# Patient Record
Sex: MALE | Race: WHITE | Employment: OTHER | URBAN - METROPOLITAN AREA
[De-identification: names, ages, dates, MRNs, and addresses within clinical notes are randomized per-mention and may not be internally consistent; named-entity substitution may affect disease eponyms.]

---

## 2018-11-20 ENCOUNTER — HOSPITAL ENCOUNTER (OUTPATIENT)
Dept: LAB | Age: 73
Discharge: HOME | End: 2018-11-20
Payer: MEDICARE

## 2018-11-20 LAB — VIT B12 SERPL-MCNC: >2000 PG/ML (ref 211–911)

## 2018-11-20 PROCEDURE — 36415 COLL VENOUS BLD VENIPUNCTURE: CPT

## 2018-11-20 PROCEDURE — 82607 VITAMIN B-12: CPT

## 2018-11-26 LAB
FAX TO INFO,FAXT: NORMAL
FAX TO NUMBER,FAXN: NORMAL

## 2021-10-31 ENCOUNTER — HOSPITAL ENCOUNTER (INPATIENT)
Age: 76
LOS: 2 days | Discharge: HOME | DRG: 065 | End: 2021-11-02
Attending: EMERGENCY MEDICINE | Admitting: HOSPITALIST
Payer: MEDICARE

## 2021-10-31 DIAGNOSIS — I48.92 ATRIAL FLUTTER, UNSPECIFIED TYPE (HCC): ICD-10-CM

## 2021-10-31 PROCEDURE — 65660000004 HC RM CVT STEPDOWN

## 2021-11-01 ENCOUNTER — APPOINTMENT (OUTPATIENT)
Dept: MRI IMAGING | Age: 76
DRG: 065 | End: 2021-11-01
Attending: HOSPITALIST
Payer: MEDICARE

## 2021-11-01 ENCOUNTER — APPOINTMENT (OUTPATIENT)
Dept: NON INVASIVE DIAGNOSTICS | Age: 76
DRG: 065 | End: 2021-11-01
Attending: HOSPITALIST
Payer: MEDICARE

## 2021-11-01 PROBLEM — I63.9 ACUTE CVA (CEREBROVASCULAR ACCIDENT) (HCC): Status: ACTIVE | Noted: 2021-11-01

## 2021-11-01 LAB
ATRIAL RATE: 102 BPM
CALCULATED R AXIS, ECG10: -60 DEGREES
CALCULATED T AXIS, ECG11: 48 DEGREES
DIAGNOSIS, 93000: NORMAL
ECHO AO ROOT DIAM: 4.36 CM
ECHO LA AREA 4C: 17.14 CM2
ECHO LA VOL 2C: 28.59 ML (ref 18–58)
ECHO LA VOL 4C: 40.65 ML (ref 18–58)
ECHO LA VOL BP: 38.16 ML (ref 18–58)
ECHO LA VOL/BSA BIPLANE: 16.81 ML/M2 (ref 16–28)
ECHO LA VOLUME INDEX A2C: 12.59 ML/M2 (ref 16–28)
ECHO LA VOLUME INDEX A4C: 17.91 ML/M2 (ref 16–28)
ECHO LV INTERNAL DIMENSION DIASTOLIC: 5.31 CM (ref 4.2–5.9)
ECHO LV INTERNAL DIMENSION SYSTOLIC: 3.63 CM
ECHO LV IVSD: 1.41 CM (ref 0.6–1)
ECHO LV MASS 2D: 313.3 G (ref 88–224)
ECHO LV MASS INDEX 2D: 138 G/M2 (ref 49–115)
ECHO LV POSTERIOR WALL DIASTOLIC: 1.35 CM (ref 0.6–1)
ECHO LVOT DIAM: 2.14 CM
ECHO LVOT PEAK GRADIENT: 3.22 MMHG
ECHO LVOT PEAK VELOCITY: 89.76 CM/S
ECHO LVOT SV: 62.3 ML
ECHO LVOT VTI: 17.29 CM
ECHO MV A VELOCITY: 53.67 CM/S
ECHO MV E DECELERATION TIME (DT): 171.27 MS
ECHO MV E VELOCITY: 92.93 CM/S
ECHO MV E/A RATIO: 1.73
LVOT MG: 1.37 MMHG
Q-T INTERVAL, ECG07: 412 MS
QRS DURATION, ECG06: 108 MS
QTC CALCULATION (BEZET), ECG08: 457 MS
TSH SERPL DL<=0.05 MIU/L-ACNC: 3.89 UIU/ML (ref 0.36–3.74)
VENTRICULAR RATE, ECG03: 74 BPM

## 2021-11-01 PROCEDURE — 92610 EVALUATE SWALLOWING FUNCTION: CPT

## 2021-11-01 PROCEDURE — 97165 OT EVAL LOW COMPLEX 30 MIN: CPT

## 2021-11-01 PROCEDURE — 99222 1ST HOSP IP/OBS MODERATE 55: CPT | Performed by: PSYCHIATRY & NEUROLOGY

## 2021-11-01 PROCEDURE — 93306 TTE W/DOPPLER COMPLETE: CPT

## 2021-11-01 PROCEDURE — 74011250637 HC RX REV CODE- 250/637: Performed by: HOSPITALIST

## 2021-11-01 PROCEDURE — 36415 COLL VENOUS BLD VENIPUNCTURE: CPT

## 2021-11-01 PROCEDURE — 65660000000 HC RM CCU STEPDOWN

## 2021-11-01 PROCEDURE — 84443 ASSAY THYROID STIM HORMONE: CPT

## 2021-11-01 PROCEDURE — 93005 ELECTROCARDIOGRAM TRACING: CPT

## 2021-11-01 PROCEDURE — 70551 MRI BRAIN STEM W/O DYE: CPT

## 2021-11-01 PROCEDURE — 97530 THERAPEUTIC ACTIVITIES: CPT

## 2021-11-01 PROCEDURE — 74011250637 HC RX REV CODE- 250/637: Performed by: INTERNAL MEDICINE

## 2021-11-01 PROCEDURE — 97162 PT EVAL MOD COMPLEX 30 MIN: CPT

## 2021-11-01 PROCEDURE — 74011250636 HC RX REV CODE- 250/636: Performed by: HOSPITALIST

## 2021-11-01 PROCEDURE — 97116 GAIT TRAINING THERAPY: CPT

## 2021-11-01 PROCEDURE — 99223 1ST HOSP IP/OBS HIGH 75: CPT | Performed by: HOSPITALIST

## 2021-11-01 RX ORDER — CLOPIDOGREL BISULFATE 75 MG/1
75 TABLET ORAL DAILY
Status: DISCONTINUED | OUTPATIENT
Start: 2021-11-02 | End: 2021-11-01

## 2021-11-01 RX ORDER — GUAIFENESIN 100 MG/5ML
81 LIQUID (ML) ORAL DAILY
Status: DISCONTINUED | OUTPATIENT
Start: 2021-11-01 | End: 2021-11-02 | Stop reason: HOSPADM

## 2021-11-01 RX ORDER — HEPARIN SODIUM 5000 [USP'U]/ML
5000 INJECTION, SOLUTION INTRAVENOUS; SUBCUTANEOUS EVERY 8 HOURS
Status: DISCONTINUED | OUTPATIENT
Start: 2021-11-01 | End: 2021-11-01 | Stop reason: SDUPTHER

## 2021-11-01 RX ORDER — ATORVASTATIN CALCIUM 40 MG/1
80 TABLET, FILM COATED ORAL
Status: DISCONTINUED | OUTPATIENT
Start: 2021-11-01 | End: 2021-11-02 | Stop reason: HOSPADM

## 2021-11-01 RX ORDER — SODIUM CHLORIDE 9 MG/ML
75 INJECTION, SOLUTION INTRAVENOUS CONTINUOUS
Status: DISPENSED | OUTPATIENT
Start: 2021-11-01 | End: 2021-11-02

## 2021-11-01 RX ADMIN — APIXABAN 5 MG: 5 TABLET, FILM COATED ORAL at 18:05

## 2021-11-01 RX ADMIN — HEPARIN SODIUM 5000 UNITS: 5000 INJECTION INTRAVENOUS; SUBCUTANEOUS at 09:22

## 2021-11-01 RX ADMIN — SODIUM CHLORIDE 75 ML/HR: 900 INJECTION, SOLUTION INTRAVENOUS at 01:41

## 2021-11-01 RX ADMIN — HEPARIN SODIUM 5000 UNITS: 5000 INJECTION INTRAVENOUS; SUBCUTANEOUS at 01:41

## 2021-11-01 RX ADMIN — ATORVASTATIN CALCIUM 80 MG: 40 TABLET, FILM COATED ORAL at 22:09

## 2021-11-01 RX ADMIN — ATORVASTATIN CALCIUM 80 MG: 40 TABLET, FILM COATED ORAL at 01:41

## 2021-11-01 RX ADMIN — ASPIRIN 81 MG CHEWABLE TABLET 81 MG: 81 TABLET CHEWABLE at 09:22

## 2021-11-01 NOTE — PROGRESS NOTES
OCCUPATIONAL THERAPY EVALUATION/DISCHARGE    Patient: Maico Ahmadi (24 y.o. male)  Date: 11/1/2021  Primary Diagnosis: Acute CVA (cerebrovascular accident) [facility]) [I63.9]        Precautions:   Fall  PLOF: Pt was independent with basic self care tasks and used a walking stick for functional mobility PTA. He lived alone. ASSESSMENT AND RECOMMENDATIONS:  Based on the objective data described below, the patient is able to perform basic self care tasks without assistance while seated. He has a right homonymous hemianopsia limiting his spatial awareness with mobility. Patient able to compensate by consciously deviating eyes and/or head to the right. Discussed compensatory strategies such using red tape or visual marker and he verbalized understanding. Supervision given for functional standing and transfers. He had a loss of balance initially with standing from bed but was able to self correct. Will defer to PT for mobility training. Patient has a supportive daughter who he will stay with upon discharge to assist him prn. Skilled occupational therapy is not indicated at this time. Discharge Recommendations: Home Health to assess adaptive strategies at home  Further Equipment Recommendations for Discharge: N/A; patient has canes     SUBJECTIVE:   Patient stated I have a little bit of trouble thinking.     OBJECTIVE DATA SUMMARY:   No past medical history on file. No past surgical history on file.   Barriers to Learning/Limitations: yes;  altered mental status (i.e. Mild confusion at times)  Compensate with: visual, verbal, tactile, kinesthetic cues/model    Home Situation:   Home Situation  Home Environment: Private residence  # Steps to Enter: 3  Rails to Enter: Yes  Hand Rails : Right  One/Two Story Residence: Two story  # of Interior Steps: 13  Interior Rails: Left  Lift Chair Available: Yes  Living Alone: Yes  Support Systems: Child(hui)  Patient Expects to be Discharged toF Cor[de-identified]ration  Current DME Used/Available at Home: Cane, straight  Tub or Shower Type: Shower (with grab bar)  []     Right hand dominant   [x]     Left hand dominant    Cognitive/Behavioral Status:  Neurologic State: Alert  Orientation Level: Oriented X4  Cognition: Follows commands  Safety/Judgement: Fall prevention    Skin: Intact on UEs  Edema: None noted in UEs    Vision/Perceptual:    Tracking: Able to track stimulus in all quadrants w/o difficulty      Visual Fields: Difficulty detecting stimulus  in right lateral quadrant; Difficulty detecting stimulus in right lower quadrant; Difficulty detecting stimulus in right upper quadrant   Acuity: Within Defined Limits      Coordination: BUE  Fine Motor Skills-Upper: Left Intact; Right Intact    Gross Motor Skills-Upper: Left Intact; Right Intact    Balance:  Sitting: Intact  Sitting - Static: Good (unsupported)  Sitting - Dynamic: Good (unsupported)  Standing: Impaired  Standing - Static: Good  Standing - Dynamic : Good    Strength: BUE  Strength: Within functional limits    Tone & Sensation: BUE  Tone: Normal  Sensation: Intact    Range of Motion: BUE  AROM: Within functional limits    Functional Mobility and Transfers for ADLs:  Bed Mobility:  Supine to Sit: Modified independent  Sit to Supine: Modified independent    Transfers:  Sit to Stand: Supervision  Stand to Sit: Supervision   Toilet Transfer : Supervision    ADL Assessment:  Feeding: Modified independent    Oral Facial Hygiene/Grooming: Modified Independent    Bathing: Supervision    Upper Body Dressing: Modified independent    Lower Body Dressing: Supervision    Toileting: Supervision    Pain:  Pain level pre-treatment: 0/10   Pain level post-treatment: 0/10   Pain Intervention(s): NA  Response to intervention: NA    Activity Tolerance:   Good  Please refer to the flowsheet for vital signs taken during this treatment.   After treatment:   []  Patient left in no apparent distress sitting up in chair  [x]  Patient left in no apparent distress in bed  [x]  Call bell left within reach  [x]  Nursing notified  []  Caregiver present  []  Bed alarm activated    COMMUNICATION/EDUCATION:   [x]      Role of Occupational Therapy in the acute care setting  [x]      Home safety education was provided and the patient/caregiver indicated understanding. [x]      Patient/family have participated as able and agree with findings and recommendations. []      Patient is unable to participate in plan of care at this time. Thank you for this referral.  Halie Hooper MS OTR/L   Time Calculation: 23 mins      Eval Complexity: History: LOW Complexity : Brief history review ; Examination: LOW Complexity : 1-3 performance deficits relating to physical, cognitive , or psychosocial skils that result in activity limitations and / or participation restrictions ;    Decision Making:LOW Complexity : No comorbidities that affect functional and no verbal or physical assistance needed to complete eval tasks

## 2021-11-01 NOTE — PROGRESS NOTES
ARU/IPR REFERRAL CONTACT NOTE    [address] [place] [address] for Physical Rehabilitation  RE:Bunny Mclean   Thank you for the opportunity to review this patient's case for admission to [address] [place] [address] for Physical Rehabilitation. Based on our pre-admission screening:     [x ] This patient does not meet criteria for admission to [facility] for Physical Rehabilitation due to:    [x ] Too functional, per documentation, patient does not require both Physical and Occupational Therapy Services at an acute rehabilitation level of intensity. Again, Thank you for this referral. Should you have any questions please do not hesitate to call. Sincerely,  Hossein Palmer. Eva Haskins, [address]   Eva Haskins RN  Admissions [facility] for Physical Rehabilitation  [phone]

## 2021-11-01 NOTE — PROGRESS NOTES
Per pt, his wife listed as his emergency contact passed away last year and he wants his daughter Pavithra Thacker [phone] as his emergency contact. Face sheet has been updated.             TRISH DennisN RN  Care Management  Pager: [phone]

## 2021-11-01 NOTE — PROGRESS NOTES
TRANSFER - IN REPORT:    [facility] Olga Bradshaw  being received from [facility] (unit) for routine progression of care    Report consisted of patients Situation, Background, Assessment and   Recommendations(SBAR). Information from the following report(s) SBAR, ED Summary, Intake/Output, MAR, Recent Results and Cardiac Rhythm NSR was reviewed with the receiving nurse. Opportunity for questions and clarification was provided. Assessment completed upon patients arrival to unit and care assumed. 2330 Patient arrived to unit via transport services from [facility]. VS stable. No reports of pain. Patient's only complaint is blurry peripheral vision on the right side. Patient also reports \"some memory trouble\" that has persisted since the onset of blurry vision a few days ago. Patient was oriented to the unit, pain reporting procedures, call bell usage, and vital sign assessments. 0300 Dysphagia screening conducted. No abnormal findings.

## 2021-11-01 NOTE — PROGRESS NOTES
MRI Screening form needs to be filled out and faxed to [address] MRI can be scheduled. If unable to obtain information from pt, [facility] needs to be contacted.  If pt is claustro or will need pain meds, please have ordered in advance in order to facilitate exam.

## 2021-11-01 NOTE — PROGRESS NOTES
Problem: Dysphagia (Adult)  Goal: *Acute Goals and Plan of Care (Insert Text)  Description: Patient will:  1. Tolerate PO trials with 0 s/s overt distress in 4/5 trials-met    Recommend:   Regular diet with thin liquids  Meds per pt preference  General safe swallow precautions     Outcome: Resolved/Met    Speech LAnguage Pathology bedside swallow evaluation AND DISCHARGE    Patient: Moses Vazquez (89 y.o. male)  Date: 11/1/2021  Primary Diagnosis: Acute CVA (cerebrovascular accident) [facility]) [I63.9]  Precautions: Fall    ASSESSMENT :  Clinical beside swallow eval completed per MD orders. Pt A&Ox4. Speech/voice within functional limits. Oral motor structures functional for speech and deglutition. Pt tolerating thin liquids and regular solids with no overt s/sx aspiration. Pt safe for regular diet with thin liquids, meds as tolerated with general safe swallow precautions. Pt educated with regard to s/sx aspiration, aspiration risk, diet recs and role of SLP. Pt able to verbalize understanding. Will sign off. Please re-consult as indicated. D/w RN. PLAN :  Recommendations and Planned Interventions:  No formal ST needs ID'd for dysphagia. Eval only. Discharge Recommendations: None     SUBJECTIVE:   Patient stated Thank you    OBJECTIVE:   No past medical history on file. No past surgical history on file.   Prior Level of Function/Home Situation:   Home Situation  Home Environment: Private residence  # Steps to Enter: 3  Rails to Enter: Yes  Hand Rails : Right  One/Two Story Residence: Two story  # of Interior Steps: 13  Interior Rails: Left  Lift Chair Available: Yes  Living Alone: Yes  Support Systems: Child(hui)  Patient Expects to be Discharged to[de-identified] House  Current DME Used/Available at Home: Cane, straight  Diet prior to admission: regular/thin liquids  Current Diet:  regular/thin liquids    Cognitive and Communication Status:  Neurologic State: Alert  Orientation Level: Oriented X4  Cognition: Follows commands  Oral Assessment:  Oral Assessment  Labial: No impairment  Dentition: Natural  Oral Hygiene: Good  Lingual: No impairment  Velum: No impairment  Mandible: No impairment  P.O.  Trials:  Patient Position: 45 at [address] [facility]  Vocal quality prior to P.O.: No impairment  Aspiration Signs/Symptoms: None  Oral Phase Severity: No impairment  Pharyngeal Phase Severity : No impairment    The severity rating is based on the following outcomes:    Clinical judgment    Pain:  Not reported prior to or following     After treatment:   []            Patient left in no apparent distress sitting up in chair  [x]            Patient left in no apparent distress in bed  [x]            Call bell left within reach  [x]            Nursing notified  []            Caregiver present  []            Bed alarm activated    COMMUNICATION/EDUCATION:   [x]            SLP educated pt with regard to aspiration s/sx and to alert MD/RN     Thank you for this referral,   JUDIE CatherineS., [address] [facility]  Speech-Language Pathologist

## 2021-11-01 NOTE — PROGRESS NOTES
Patient is off to the floor (radiology) at the time of visit. Patient is not available to be assessed at this time.       Diann Cabrera 605 [phone]

## 2021-11-01 NOTE — H&P
History & Physical    Patient: Amparo Cintron MRN: [id number]  CSN: [id number]    YOB: 1945  Age: 70 y.o. Sex: male      DOA: 10/31/2021    No chief complaint on file. Dragon medical dictation software was used for portions of this report. Unintended errors may occur. If you have any questions regarding the note or its content please set up a time to discuss by calling the nurse on the floor. Assessment/Plan     Acute CVA: Loss of peripheral vision on the right side. We will do neurochecks. Telemetry. Will check echocardiogram.  Will check MRI of the brain to rule out stroke. Will place on aspirin, statin. BPH: will resume flomax HS. Hypertension: will resume Lisinopril 5 mg daily. DVT prophylaxis: heparin Sq. Active Problems:    Acute CVA (cerebrovascular accident) ([facility] [address] (11/1/2021)         HPI:     Amparo Cintron is a 70 y.o. male who presented to emergency room in [facility] emergency room with right-sided peripheral vision for the 1 to 2 days. Patient is from [place] to go hunting and noticed some blurriness on the right side and it slowly went away. And then he is noticed that it came back and persisted since so presented to the emergency room there. Patient denies any weakness no speech issues no numbness and he mentions that he takes a daily aspirin on a regular basis and never had any history of TIA or CVA or any coronary artery disease. Patient had a CTA of the head that showed a mild atherosclerosis and a normal patent abdominal artery. Patient did have some blockage of the right and left internal carotid artery but no significant proximal arterial disease. CT head did not show any acute abnormalities. EKG showed sinus bradycardia and a left bundle branch block. No past medical history on file. No past surgical history on file. No family history on file.     Social History     Socioeconomic History    Marital status:  Spouse name: Not on file    Number of children: Not on file    Years of education: Not on file    Highest education level: Not on file     Social Determinants of Health     Financial Resource Strain:     Difficulty of Paying Living Expenses:    Food Insecurity:     Worried About Running Out of Food in the Last Year:     [address] in the Last Year:    Transportation Needs:     Lack of Transportation (Medical):  Lack of Transportation (Non-Medical):    Physical Activity:     Days of Exercise per Week:     Minutes of Exercise per Session:    Stress:     Feeling of Stress :    Social Connections:     Frequency of Communication with Friends and Family:     Frequency of Social Gatherings with Friends and Family:     Attends Christian Services:     Active Member of Clubs or Organizations:     Attends Club or Organization Meetings:     Marital Status:        Prior to Admission medications       Aspirin 81 mg daily  Lipitor 80 mg daily  Plavix on 5 g daily  Lisinopril 5 mg daily  Flomax 0.4 mg at bedtime  Ergocalciferol 50,000 units once a week  Neurontin 600 mg daily  Cyanocobalamin 1000 units once a day    No known drug allergies    Review of Systems  GENERAL: No fevers or chills. HEENT: No change in vision, no earache, tinnitus, sore throat or sinus congestion. NECK: No pain or stiffness. CARDIOVASCULAR: No chest pain or pressure. No palpitations. PULMONARY: No shortness of breath, cough or wheeze. GASTROINTESTINAL: No abdominal pain, nausea, vomiting or diarrhea, melena or bright red blood per rectum. GENITOURINARY: No urinary frequency, urgency, hesitancy or dysuria. MUSCULOSKELETAL: No joint or muscle pain, no back pain, no recent trauma. DERMATOLOGIC: No rash, no itching, no lesions. ENDOCRINE: No polyuria, polydipsia, no heat or cold intolerance. No recent change in weight. HEMATOLOGICAL: No anemia or easy bruising or bleeding.    NEUROLOGIC: No headache, seizures, numbness, tingling or weakness. PSYCHIATRIC: No depression, anxiety, mood disorder, no loss of interest in normal activity or change in sleep pattern. Physical Exam:     Physical Exam:  Vitals reviewed. General:  Alert, cooperative, no distress, appears stated age. Head:  Normocephalic, without obvious abnormality, atraumatic. Eyes:  Conjunctivae/corneas clear. PERRL, EOMs intact. Nose: Nares normal. No drainage or sinus tenderness. Throat: Lips, mucosa, and tongue normal. Teeth and gums normal.   Neck: Supple, symmetrical, trachea midline, no adenopathy, thyroid: no enlargement/tenderness/nodules, no carotid bruit and no JVD. Back:   ROM normal. No CVA tenderness. Lungs:   Clear to auscultation bilaterally. Chest wall:  No tenderness or deformity. Heart:  Regular rate and rhythm, S1, S2 normal, no murmur, click, rub or gallop. Abdomen: Soft, non-tender. Bowel sounds normal. No masses,  No organomegaly. Extremities: Extremities normal, atraumatic, no cyanosis or edema. Pulses: 2+ and symmetric all extremities. Skin: Skin color, texture, turgor normal. No rashes or lesions   Neurologic: CNII-XII intact. No focal motor or sensory deficit. Peripheral loss of vision. Right side.        Labs Reviewed:  Hemoglobin A1c 5.7 total cholesterol 131  HDL 35  LDL 70  VLDL 26  Triglycerides 128    Sodium 136 potassium 4.4 chloride 100 bicarbonate 28 BUN 14 creatinine 0.79 blood sugars 106      CTA HEAD/NECK STROKE ALERT (EXP) (10/29/2021 12:34 PM EDT)  CTA HEAD/NECK STROKE ALERT (EXP) (10/29/2021 12:34 PM EDT)   Specimen         CTA HEAD/NECK STROKE ALERT (EXP) (10/29/2021 12:34 PM EDT)   Narrative Performed At   CLINICAL HISTORY: 70 years Male Neuro deficit, acute, stroke suspected   blurry RIGHT-sided peripheral vision for 2 days    EXAM: CTA HEAD/NECK STROKE ALERT (EXP)     COMPARISON: Noncontrast head CT performed earlier today    TECHNIQUE:     -CT angiogram of the brain and neck with and without intravenous contrast using CTA protocol with axial, coronal, sagittal reformats. Additional 3-D imaging reconstruction was performed for better evaluation of the anatomy.    -Additional CT of the brain was performed after the administration of intravenous contrast.        Dose: Omnipaque 350, 90  mL        FINDINGS:          Enhancement: No abnormal enhancement.         CTA Neck:        SOFT TISSUES/OSSEOUS STRUCTURES        Submandibular glands: Unremarkable    Parotid glands: Unremarkable    Thyroid gland: Normal size    Lymph nodes: Not enlarged by CT size criteria    Airway: Patent    Esophagus: Unremarkable    Visualized portions of the lungs: Clear    Cervical spine/ osseous structures: Multilevel cervical spondylosis. Severe disc height loss at C3-4, C6-7.        ====================        Aortic arch: 3 vessel arch. Mild calcific atherosclerosis.    RIGHT subclavian artery: Unremarkable    LEFT subclavian artery: Unremarkable        RIGHT vertebral artery (V1-V3): Nondominant. There are moderate stenosis of the V1 segment, remainder of the vessel is patent.    LEFT vertebral artery (V1-V3): Dominant, widely patent        RIGHT common carotid artery: Atherosclerotic plaque at the bifurcation without significant stenosis    RIGHT internal carotid artery: Calcified and noncalcified plaque in the proximal portion contributes to 68% stenosis by NASCET criteria, approximately 15 mm beyond the origin (series 5, image 272)    RIGHT external carotid artery: Moderate stenosis proximally, otherwise unremarkable        LEFT common carotid artery: Atherosclerotic plaque at the bifurcation without significant stenosis    LEFT internal carotid artery: Calcified and noncalcified plaque in the proximal portion contributes to 62% stenosis by NASCET criteria (series 5, image 255)    LEFT external carotid artery: Unremarkable        CTA Head:        Dural venous sinuses: Patent        RIGHT intracranial ICA: Mild siphon atherosclerosis. Ophthalmic artery is patent.    LEFT intracranial ICA: Mild siphon atherosclerosis. Ophthalmic artery is patent.        RIGHT anterior cerebral artery: Unremarkable    LEFT anterior cerebral artery: Unremarkable    Anterior communicating artery: Not well seen        RIGHT middle cerebral artery: Unremarkable    LEFT middle cerebral artery: Unremarkable        RIGHT posterior communicating artery: Robust     LEFT posterior communicating artery: Faintly seen         RIGHT posterior cerebral artery: Fetal type origin. Otherwise unremarkable    LEFT posterior cerebral artery: Unremarkable        Basilar artery: Normal         RIGHT superior cerebellar artery: Patent    LEFT superior cerebellar artery: Patent        RIGHT intracranial vertebral artery (V4): Unremarkable    LEFT intracranial vertebral artery (V4): Unremarkable        RIGHT posterior inferior cerebellar artery: Patent    LEFT posterior inferior cerebellar artery: Patent        IMPRESSION:        1. RIGHT internal carotid artery: Calcified and noncalcified plaque in the proximal portion contributes to 68% stenosis by NASCET criteria.    2. LEFT internal carotid artery: Calcified and noncalcified plaque in the proximal portion contributes to 62% stenosis by NASCET criteria (series 5, image 255)    3.  No evidence of significant stenosis of the proximal arterial vessel in the brain.   [facility]       CTA HEAD/NECK STROKE ALERT (EXP) (10/29/2021 12:34 PM EDT)   Procedure Note   Paul, Radiologyin - 10/29/2021 1:08 PM EDT    Formatting of this note might be different from the original.  CLINICAL HISTORY: 70 years Male Neuro deficit, acute, stroke suspected blurry RIGHT-sided peripheral vision for 2 days  EXAM: CTA HEAD/NECK STROKE ALERT (EXP)   COMPARISON: Noncontrast head CT performed earlier today  TECHNIQUE:   -CT angiogram of the brain and neck with and without intravenous contrast using CTA protocol with axial, coronal, sagittal reformats. Additional 3-D imaging reconstruction was performed for better evaluation of the anatomy.  -Additional CT of the brain was performed after the administration of intravenous contrast.    Dose: Omnipaque 350, 90 mL    FINDINGS:     Enhancement: No abnormal enhancement. CTA Neck:    SOFT TISSUES/OSSEOUS STRUCTURES    Submandibular glands: Unremarkable  Parotid glands: Unremarkable  Thyroid gland: Normal size  Lymph nodes: Not enlarged by CT size criteria  Airway: Patent  Esophagus: Unremarkable  Visualized portions of the lungs: Clear  Cervical spine/ osseous structures: Multilevel cervical spondylosis. Severe disc height loss at C3-4, C6-7.    ====================    Aortic arch: 3 vessel arch. Mild calcific atherosclerosis. RIGHT subclavian artery: Unremarkable  LEFT subclavian artery: Unremarkable    RIGHT vertebral artery (V1-V3): Nondominant. There are moderate stenosis of the V1 segment, remainder of the vessel is patent. LEFT vertebral artery (V1-V3): Dominant, widely patent    RIGHT common carotid artery: Atherosclerotic plaque at the bifurcation without significant stenosis  RIGHT internal carotid artery: Calcified and noncalcified plaque in the proximal portion contributes to 68% stenosis by NASCET criteria, approximately 15 mm beyond the origin (series 5, image 272)  RIGHT external carotid artery: Moderate stenosis proximally, otherwise unremarkable    LEFT common carotid artery: Atherosclerotic plaque at the bifurcation without significant stenosis  LEFT internal carotid artery: Calcified and noncalcified plaque in the proximal portion contributes to 62% stenosis by NASCET criteria (series 5, image 255)  LEFT external carotid artery: Unremarkable    CTA Head:    Dural venous sinuses: Patent    RIGHT intracranial ICA: Mild siphon atherosclerosis. Ophthalmic artery is patent. LEFT intracranial ICA: Mild siphon atherosclerosis. Ophthalmic artery is patent.     RIGHT anterior cerebral artery: Unremarkable  LEFT anterior cerebral artery: Unremarkable  Anterior communicating artery: Not well seen    RIGHT middle cerebral artery: Unremarkable  LEFT middle cerebral artery: Unremarkable    RIGHT posterior communicating artery: Robust   LEFT posterior communicating artery: Faintly seen     RIGHT posterior cerebral artery: Fetal type origin. Otherwise unremarkable  LEFT posterior cerebral artery: Unremarkable    Basilar artery: Normal     RIGHT superior cerebellar artery: Patent  LEFT superior cerebellar artery: Patent    RIGHT intracranial vertebral artery (V4): Unremarkable  LEFT intracranial vertebral artery (V4): Unremarkable    RIGHT posterior inferior cerebellar artery: Patent  LEFT posterior inferior cerebellar artery: Patent    IMPRESSION:    1. RIGHT internal carotid artery: Calcified and noncalcified plaque in the proximal portion contributes to 68% stenosis by NASCET criteria. 2. LEFT internal carotid artery: Calcified and noncalcified plaque in the proximal portion contributes to 62% stenosis by NASCET criteria (series 5, image 255)  3. No evidence of significant stenosis of the proximal arterial vessel in the brain.       CTA HEAD/NECK STROKE ALERT (EXP) (10/29/2021 12:34 PM EDT)   Performing Organization Address City/State/ZIP Code Phone Number   Aníbal Casarez                 Procedures/imaging: see electronic medical records for all procedures/Xrays and details which were not copied into this note but were reviewed prior to creation of Ghada George MD  November 1, 2021  [phone].

## 2021-11-01 NOTE — PROGRESS NOTES
conducted an initial consultation and Spiritual Assessment for Moni Saavedra, who is a 70 y. o.,male. Patients Primary Language is: [place]. According to the patients EMR Holiness Affiliation is: Alevism. The reason the Patient came to the hospital is:   Patient Active Problem List    Diagnosis Date Noted    [facility] 11/01/2021        The  provided the following Interventions:  Initiated a relationship of care and support with patient in room 99 [id number] today as he was cleaning up with the morning rituals. Listened empathically as patient shared his opinions about being here and how he was feeling. Patient has no complaints and expects this to be a short stay. There is no advance directive. Provided information about Spiritual Care Services. Offered prayer and assurance of continued prayers on patients behalf. The following outcomes were achieved:  Patient shared limited information about his medical narrative and spiritual journey/beliefs. Patient processed feeling about current hospitalization. Patient expressed gratitude for pastoral care visit. Assessment:  Patient does not have any Advent/cultural needs that will affect patients preferences in health care. There are no further spiritual or Advent issues which require Spiritual Care Services interventions at this time. Plan:  Chaplains will continue to follow and will provide pastoral care on an as needed/requested basis    . Rue All   Spiritual Care   [phone]

## 2021-11-01 NOTE — PROGRESS NOTES
Reason for Admission:  Acute CVA (cerebrovascular accident) ([facility] [I63.9]                 RUR Score:    10           Plan for utilizing home health: To be determined                      Likelihood of Readmission:   LOW                         Transition of Care Plan:              Initial assessment completed with patient. Cognitive status of patient: oriented to time, place, person and situation. Face sheet information confirmed:  yes. The patient designates her daughter Gabriella Walters [phone] to participate in his discharge plan and to receive any needed information. This patient lives in a home  alone. Patient is able to navigate steps as needed. Prior to hospitalization, patient was considered to be independent with ADLs/IADLS : yes . Patient has a current ACP document on file: no      Healthcare Decision Maker:     Click here to complete [address] including selection of the Healthcare Decision Maker Relationship (ie \"Primary\")    The daughter will be available to transport patient home upon discharge. The patient already has none reported medical equipment available in the home. Patient is not currently active with home health. Patient has not stayed in a skilled nursing facility or rehab. Was  stay within last 60 days : no. This patient is on dialysis :noList of available Home Health agencies were provided and reviewed with the patient prior to discharge. Freedom of choice signed: yes, for anyaccepting home health agency. Currently, the discharge plan is Home with [address] [place] [address] Dillan Gomezmike. The patient states that he can obtain his medications from the pharmacy, and take his medications as directed. Patient's current insurance is Medicare,        Care Management Interventions  PCP Verified by CM: No (per pt, his pcp is in [place])  Palliative Care Criteria Met (RRAT>21 & CHF Dx)?: No  Mode of Transport at Discharge:  Other (see comment) (family)  Transition of Care Consult (CM Consult): Discharge Planning  Physical Therapy Consult: Yes  Occupational Therapy Consult: Yes  Support Systems: Child(hui)  Confirm Follow Up Transport: Family  The Patient and/or Patient Representative was Provided with a Choice of Provider and Agrees with the Discharge Plan?: Yes  Freedom of Choice List was Provided with Basic Dialogue that Supports the Patient's Individualized Plan of Care/Goals, Treatment Preferences and Shares the Quality Data Associated with the Providers?: Yes  Discharge Location  Discharge Placement: Home with home health        NATI Mann RN  Care Management  Pager: [phone]

## 2021-11-01 NOTE — CONSULTS
NEUROLOGY CONSULT NOTE    Patient ID:  Joel Ruiz  [id number]  9 y.o.  1945    Date of Consultation:  November 1, 2021    Referring Physician: DR Anna Shields    Reason for Consultation:  Visual disturbance        Subjective:       History of Present Illness:     Joel Ruiz is a 70 y.o. male who presented to emergency room with right-sided peripheral vision which began about 4 days ago. Patient is from [address] [place] [address] to go hunting and noticed some blurriness on the right side and it slowly went away. And then he is noticed that it came back and persisted since so presented to the emergency room there. Patient denies any weakness no speech issues no numbness and he mentions that he takes a daily aspirin on a regular basis and never had any history of TIA or CVA or any coronary artery disease. Patient had a CTA of the head that showed a mild atherosclerosis and a normal patent abdominal artery. Patient did have some blockage of the right and left internal carotid artery but no significant proximal arterial disease. CT head did not show any acute abnormalities. EKG showed sinus bradycardia and a left bundle branch block. He denies any irregular heart rate or concern for prior arrhythmias             Prior to Admission medications        Aspirin 81 mg daily  Lipitor 80 mg daily  Lisinopril 5 mg daily  Flomax 0.4 mg at bedtime  Ergocalciferol 50,000 units once a week  Neurontin 600 mg daily  Cyanocobalamin 1000 units once a day     No known drug allergies     Review of Systems  GENERAL: No fevers or chills. HEENT: No change in vision, no earache, tinnitus, sore throat or sinus congestion. NECK: No pain or stiffness. CARDIOVASCULAR: No chest pain or pressure. No palpitations. PULMONARY: No shortness of breath, cough or wheeze. GASTROINTESTINAL: No abdominal pain, nausea, vomiting or diarrhea, melena or bright red blood per rectum. GENITOURINARY: No urinary frequency, urgency, hesitancy or dysuria. MUSCULOSKELETAL: No joint or muscle pain, no back pain, no recent trauma. DERMATOLOGIC: No rash, no itching, no lesions. ENDOCRINE: No polyuria, polydipsia, no heat or cold intolerance. No recent change in weight. HEMATOLOGICAL: No anemia or easy bruising or bleeding. NEUROLOGIC: No headache, seizures, numbness, tingling or weakness. PSYCHIATRIC: No depression, anxiety, mood disorder, no loss of interest in normal activity or change in sleep pattern. Patient Active Problem List    Diagnosis Date Noted    Acute CVA (cerebrovascular accident) ([facility] 11/01/2021     No past medical history on file. No past surgical history on file. Prior to Admission medications    Not on File     No Known Allergies   Social History     Tobacco Use    Smoking status: Not on file   Substance Use Topics    Alcohol use: Not on file      No family history on file. Objective:     Patient Vitals for the past 8 hrs:   BP Temp Pulse Resp SpO2 Height Weight   11/01/21 1652 (!) 156/86 98.9 °F (37.2 °C) 65 18 95 %     11/01/21 1152 (!) 151/73 98.1 °F (36.7 °C) 72 18 96 %     11/01/21 1113 (!) 154/79     6' (1.829 m) 105.2 kg (232 lb)       General Exam  No acute distress, normal body habitus    HEENT: Normocephalic, atraumatic, Sclera anicteric, normal conjunctiva  Mucous membranes: normal color and hydration   Skin: no lesions no rashes, normal color  CV: No carotid bruits,   Heart: regular to rate and rhythm. No murmurs     Neurologic Exam:    Mental status:  Alert, oriented to person, place, time and circumstance  No visual spatial neglect or overt apraxia    Language: normal fluency and comprehension    Cranial nerves: PERRL, Dense right homonymous hemianopia.  Extraocular movements intact and full, face symmetric to movement, Tongue midline with normal strength, palat symmetric    Motor: strength 5/5 throughout  No abnormal movements    Coordination: Normal finger-nose-finger, Normal rapid alternating movements    DTRs (R/L)  Biceps: (2/2)  Brachorad (2/2)  Triceps: (2/2)   Patellar (0/0)  Ankles (0/0)      Sensation: Intact and symmetric to light touch, touch in hands, No pronator drift              Data Review:    Recent Results (from the past 24 hour(s))   ECHO ADULT COMPLETE    Collection Time: 11/01/21 11:31 AM   Result Value Ref Range    IVSd 1.41 (A) 0.60 - 1.00 cm    LVIDd 5.31 4.20 - 5.90 cm    LVIDs 3.63 cm    LVOT d 2.14 cm    LVPWd 1.35 (A) 0.60 - 1.00 cm    LVOT Peak Gradient 3.22 mmHg    Left Ventricular Outflow Tract Mean Gradient 1.37 mmHg    LVOT SV 62.3 mL    LVOT Peak Velocity 89.76 cm/s    LVOT VTI 17.29 cm    LA Volume 38.16 18.0 - 58.0 mL    LA Area 4C 17.14 cm2    LA Vol 2C 28.59 18.00 - 58.00 mL    LA Vol 4C 40.65 18.00 - 58.00 mL    MV A Cr 53.67 cm/s    Mitral Valve E Wave Deceleration Time 171.27 ms    MV E Cr 92.93 cm/s    Ao Root D 4.36 cm    MV E/A 1.73     LV Mass .3 88.0 - 224.0 g    LV Mass AL Index 138.0 49.0 - 115.0 g/m2    LA Vol Index 16.81 16.00 - 28.00 ml/m2    LA Vol Index 12.59 16.00 - 28.00 ml/m2    LA Vol Index 17.91 16.00 - 28.00 ml/m2   EKG, 12 LEAD, INITIAL    Collection Time: 11/01/21  3:40 PM   Result Value Ref Range    Ventricular Rate 74 BPM    Atrial Rate 102 BPM    QRS Duration 108 ms    Q-T Interval 412 ms    QTC Calculation (Bezet) 457 ms    Calculated R Axis -60 degrees    Calculated T Axis 48 degrees    Diagnosis       Atrial flutter with variable AV block  Left anterior fascicular block  Septal infarct , age undetermined  Abnormal ECG  No previous ECGs available  Confirmed by Marisa Caballero MD, ----- ([id number]) on 11/1/2021 4:35:58 PM           Radiology studies: MRI brain reviewed, CTA reviewed      Assessment: This is a 69 y/o active white male with a history of hereditary neuropathy and HTN who presented with an acute onset right hemifield loss. This is due to an embolic appearing left PCA stroke.  Reportedly he is now noted to be in atrial fibrillation which is the most likely cause of this stroke. Active Problems:    Acute CVA (cerebrovascular accident) ([facility] (11/1/2021)        Plan:     1. OK to start anticoagulation  2. No need for permissive HTN at this point. Pat Richmond M.D.   Clinical Neurophysiology  Neuromuscular specialist

## 2021-11-01 NOTE — PROGRESS NOTES
Bedside shift change report given to Ivis Flower RN (oncoming nurse) by Janes Morley RN (offgoing nurse). Report included the following information SBAR, Intake/Output, MAR, Recent Results and Cardiac Rhythm a fib. Patient resting comfortably, bed in lowest position, and call bell within reach.      0600 Patient experienced several episodes of slow ventricular response overnight, with a HR as low as 35 bpm.

## 2021-11-01 NOTE — PROGRESS NOTES
ARU/IPR REFERRAL CONTACT NOTE  [address] [place] [address] for Physical Rehabilitation    RE:  Matt Enciso    Referral received to review this patient's case for admission to [address] [place] [address] Physical Rehabilitation. Current status reviewed.  When appropriate, will need PT/OT/ST evaluation/treatment on this patient to complete the pre-admission evaluation.  Will continue to follow. Thank you for this referral.  Should you have any questions please do not hesitate to call. Sincerely,  Heather Up.  [address] [place] [address] Physical Rehabilitation  [phone]

## 2021-11-01 NOTE — PROGRESS NOTES
1520 - pt in a fib controlled on tele, pt asymptomatic, MD made aware, will obtain ekg to confirm HR    1545 - ekg confirmed a fib    1820 - mri results given to MD    1900 - Bedside shift change report given to Ketan Kuo RN (oncoming nurse) by Katie Avina RN (offgoing nurse). Report included the following information SBAR, Kardex, Intake/Output, MAR, Recent Results and Cardiac Rhythm a fib.

## 2021-11-01 NOTE — PROGRESS NOTES
[facility]  Progress Note    Patient: Gretta Owens Age: 70 y.o. : 1945 MR#: [id number] SSN: [id number]  Date/Time: 2021  Subjective:     Pt seen with daughter at bedside. Reports cont'd vision abnormality. Denies weakness, numbness    Assessment/Plan:   72-year-old male admitted after he presented to the emergency room with right-sided peripheral vision loss for the last 1 to 2 days.    -Suspected stroke. Status post MRI of the brain. Currently on high-dose statin and DAPT. Now noted to have afib/flutter on monitor, discussed w/ neurologist.  -Afib/flutter: new dx, seen on tele. Rate controlled  -Cardiac echo this    HISTORY OF:  -BPH  -Hypertension    Plan:  -Start NOAC, stop plavix  -Continue statin  -Carotid duplex    Additional Notes:      Case discussed with:  [x]Patient  [x]Family  []Nursing  []Case Management  DVT Prophylaxis:  []Lovenox  [x]Hep SQ  []SCDs  []Coumadin   []On Heparin gtt    Objective:   VS:   Visit Vitals  BP (!) 151/73 (BP 1 Location: Left upper arm)   Pulse 72   Temp 98.1 °F (36.7 °C)   Resp 18   Ht 6' (1.829 m)   Wt 105.2 kg (232 lb)   SpO2 96%   BMI 31.46 kg/m²      Tmax/24hrs: Temp (24hrs), Av.9 °F (36.6 °C), Min:97.5 °F (36.4 °C), Max:98.2 °F (36.8 °C)    Input/Output:     Intake/Output Summary (Last 24 hours) at 2021 1443  Last data filed at 2021 0830  Gross per 24 hour   Intake 300 ml   Output    Net 300 ml       General:  Alert, awake, in NAD  Cardiovascular:    Pulmonary:    GI:    Extremities:     Additional:  No gross neuro abnormalities    Labs:    Recent Results (from the past 24 hour(s))   ECHO ADULT COMPLETE    Collection Time: 21 11:31 AM   Result Value Ref Range    IVSd 1.41 (A) 0.60 - 1.00 cm    LVIDd 5.31 4.20 - 5.90 cm    LVIDs 3.63 cm    LVOT d 2.14 cm    LVPWd 1.35 (A) 0.60 - 1.00 cm    LVOT Peak Gradient 3.22 mmHg    Left Ventricular Outflow Tract Mean Gradient 1.37 mmHg    LVOT SV 62.3 mL LVOT Peak Velocity 89.76 cm/s    LVOT VTI 17.29 cm    LA Volume 38.16 18.0 - 58.0 mL    LA Area 4C 17.14 cm2    LA Vol 2C 28.59 18.00 - 58.00 mL    LA Vol 4C 40.65 18.00 - 58.00 mL    MV A Cr 53.67 cm/s    Mitral Valve E Wave Deceleration Time 171.27 ms    MV E Cr 92.93 cm/s    Ao Root D 4.36 cm    MV E/A 1.73     LV Mass .3 88.0 - 224.0 g    LV Mass AL Index 138.0 49.0 - 115.0 g/m2    LA Vol Index 16.81 16.00 - 28.00 ml/m2    LA Vol Index 12.59 16.00 - 28.00 ml/m2    LA Vol Index 17.91 16.00 - 28.00 ml/m2     Additional Data Reviewed:      Signed By: Leelee Parra MD     November 1, 2021 2:43 PM

## 2021-11-01 NOTE — PROGRESS NOTES
Problem: Mobility Impaired (Adult and Pediatric)  Goal: *Acute Goals and Plan of Care (Insert Text)  Description: Physical Therapy Goals  Initiated 11/1/2021 and to be accomplished within 7 day(s)  1. Patient will move from supine to sit and sit to supine in bed with modified independence. 2.  Patient will transfer from bed to chair and chair to bed with modified independence using the least restrictive device. 3.  Patient will perform sit to stand with modified independence. 4.  Patient will ambulate with modified independence for 150 feet with the least restrictive device. 5.  Patient will ascend/descend 10 stairs with handrail(s) with supervision/set-up. PLOF: Independent. Has walking sticks if needed. Lives alone. Has family support. 3 steps to enter two story home; has stair lift for indoor stairs if needed. Outcome: Progressing Towards Goal   PHYSICAL THERAPY EVALUATION    Patient: Lauryn Turcios (76 y.o. male)  Date: 11/1/2021  Primary Diagnosis: Acute CVA (cerebrovascular accident) [facility]) [I63.9]  Precautions: Fall  ASSESSMENT :  GODWIN Cooley cleared patient for PT. /73 prior to mobility. Right visual deficit; absent right peripheral vision with blurry vision for central visual field. Blurry vision improves with covering left eye. Mod I for supine to sit. Good seated balance. Supervision for sit to stand. Supervision for amb 25ft; occasional UE support to compensate for visual deficits. Educated on scanning compensation techniques to assist right visual field deficits. Returned to seated in bed. Family present and participated in education. Patient and family request PT follow up to trial stairs prior to discharge. Patient will benefit from skilled intervention to address the above impairments.   Patient's rehabilitation potential is considered to be Good  Factors which may influence rehabilitation potential include:   []         None noted  []         Mental ability/status  [x] Medical condition  []         Home/family situation and support systems  []         Safety awareness  []         Pain tolerance/management  []         Other:      PLAN :  Recommendations and Planned Interventions:   [x]           Bed Mobility Training             [x]    Neuromuscular Re-Education  [x]           Transfer Training                   []    Orthotic/Prosthetic Training  [x]           Gait Training                          []    Modalities  [x]           Therapeutic Exercises           []    Edema Management/Control  [x]           Therapeutic Activities            [x]    Family Training/Education  [x]           Patient Education  []           Other (comment):    Frequency/Duration: Patient will be followed by physical therapy 1-2 times a week to address goals. Discharge Recommendations: Home Health  Further Equipment Recommendations for Discharge: straight cane; if needed     SUBJECTIVE:   Patient stated I have walking sticks for rough terrain.     OBJECTIVE DATA SUMMARY:   No past medical history on file. No past surgical history on file. Barriers to Learning/Limitations: yes;  sensory deficits-vision/hearing/speech  Compensate with: Visual Cues, Verbal Cues, Tactile Cues and Kinesthetic Cues    Home Situation:  Home Situation  Home Environment: Private residence  # Steps to Enter: 3  Rails to Enter: Yes  Hand Rails : Right  One/Two Story Residence: Two story  # of Interior Steps: 13  Interior Rails: Left  Lift Chair Available: Yes  Living Alone: Yes  Support Systems: Child(hui)  Patient Expects to be Discharged to[de-identified] [employer]  Current DME Used/Available at Home: Cane, straight    Critical Behavior:  Neurologic State: Alert  Orientation Level: Oriented X4  Cognition: Follows commands     Psychosocial  Patient Behaviors: Cooperative  Purposeful Interaction: Yes  Pt Identified Daily Priority: Clinical issues (comment)  Caritas Process: Nurture loving kindness  Caring Interventions: Reassure; Therapeutic modalities  Reassure: Informing  Therapeutic Modalities: Intentional therapeutic touch    Strength:    Manual Muscle Testing (LE)         R     L    Hip Flexion:   5/5 5/5  Knee EXT:   5/5 5/5  Knee FLEX:   5/5 5/5  Ankle DF:   5/5 5/5  _________________________________________________   Range Of Motion:  BLE AROM WFL   Functional Mobility:  Bed Mobility:  Supine to Sit: Modified independent  Sit to Supine: Modified independent  Transfers:  Sit to Stand: Supervision  Stand to Sit: Supervision  Balance:   Sitting: Impaired  Sitting - Static: Good (unsupported)  Sitting - Dynamic: Good (unsupported)  Standing: Impaired  Standing - Static: Good  Standing - Dynamic : Good  Ambulation/Gait Training:  Distance (ft): 25 Feet (ft)   Ambulation - Level of Assistance: Supervision  Pain:  Pain level pre-treatment: 0/10   Pain level post-treatment: 0/10     Activity Tolerance:   Good    After treatment:   []         Patient left in no apparent distress sitting up in chair  [x]         Patient left in no apparent distress in bed  [x]         Call bell left within reach  [x]         Nursing notified  []         Caregiver present  []         Bed alarm activated  []         SCDs applied    COMMUNICATION/EDUCATION:   [x]         Role of physical therapy and plan of care in the acute care setting. [x]         Fall prevention education was provided and the patient/caregiver indicated understanding. [x]         Patient/family have participated as able in goal setting and plan of care. []         Patient/family agree to work toward stated goals and plan of care. []         Patient understands intent and goals of therapy, but is neutral about his/her participation. []         Patient is unable to participate in goal setting/plan of care: ongoing with therapy staff.     Thank you for this referral.  Ladarius Sheth, PADILLA   Time Calculation: 20 mins    Eval Complexity: History: MEDIUM  Complexity : 1-2 comorbidities / personal factors will impact the outcome/ POC Exam:MEDIUM Complexity : 3 Standardized tests and measures addressing body structure, function, activity limitation and / or participation in recreation  Presentation: MEDIUM Complexity : Evolving with changing characteristics  Clinical Decision Making:Medium Complexity    Clinical judgement; ROM, MMT, functional mobility Overall Complexity:MEDIUM

## 2021-11-02 ENCOUNTER — APPOINTMENT (OUTPATIENT)
Dept: VASCULAR SURGERY | Age: 76
DRG: 065 | End: 2021-11-02
Attending: INTERNAL MEDICINE
Payer: MEDICARE

## 2021-11-02 VITALS
HEIGHT: 72 IN | DIASTOLIC BLOOD PRESSURE: 76 MMHG | OXYGEN SATURATION: 97 % | WEIGHT: 228.4 LBS | HEART RATE: 68 BPM | RESPIRATION RATE: 18 BRPM | TEMPERATURE: 98 F | SYSTOLIC BLOOD PRESSURE: 119 MMHG | BODY MASS INDEX: 30.94 KG/M2

## 2021-11-02 LAB
ANION GAP SERPL CALC-SCNC: 5 MMOL/L (ref 3–18)
BASOPHILS # BLD: 0 K/UL (ref 0–0.1)
BASOPHILS NFR BLD: 1 % (ref 0–2)
BUN SERPL-MCNC: 22 MG/DL (ref 7–18)
BUN/CREAT SERPL: 22 (ref 12–20)
CALCIUM SERPL-MCNC: 9.8 MG/DL (ref 8.5–10.1)
CHLORIDE SERPL-SCNC: 102 MMOL/L (ref 100–111)
CO2 SERPL-SCNC: 28 MMOL/L (ref 21–32)
CREAT SERPL-MCNC: 0.98 MG/DL (ref 0.6–1.3)
DIFFERENTIAL METHOD BLD: ABNORMAL
EOSINOPHIL # BLD: 0.2 K/UL (ref 0–0.4)
EOSINOPHIL NFR BLD: 3 % (ref 0–5)
ERYTHROCYTE [DISTWIDTH] IN BLOOD BY AUTOMATED COUNT: 12.9 % (ref 11.6–14.5)
GLUCOSE BLD STRIP.AUTO-MCNC: 105 MG/DL (ref 70–110)
GLUCOSE SERPL-MCNC: 99 MG/DL (ref 74–99)
HCT VFR BLD AUTO: 47 % (ref 36–48)
HGB BLD-MCNC: 15.5 G/DL (ref 13–16)
LYMPHOCYTES # BLD: 1.3 K/UL (ref 0.9–3.6)
LYMPHOCYTES NFR BLD: 19 % (ref 21–52)
MCH RBC QN AUTO: 28.7 PG (ref 24–34)
MCHC RBC AUTO-ENTMCNC: 33 G/DL (ref 31–37)
MCV RBC AUTO: 86.9 FL (ref 78–100)
MONOCYTES # BLD: 0.7 K/UL (ref 0.05–1.2)
MONOCYTES NFR BLD: 10 % (ref 3–10)
NEUTS SEG # BLD: 4.6 K/UL (ref 1.8–8)
NEUTS SEG NFR BLD: 67 % (ref 40–73)
PLATELET # BLD AUTO: 257 K/UL (ref 135–420)
PMV BLD AUTO: 10 FL (ref 9.2–11.8)
POTASSIUM SERPL-SCNC: 4.4 MMOL/L (ref 3.5–5.5)
RBC # BLD AUTO: 5.41 M/UL (ref 4.35–5.65)
SODIUM SERPL-SCNC: 135 MMOL/L (ref 136–145)
WBC # BLD AUTO: 6.8 K/UL (ref 4.6–13.2)

## 2021-11-02 PROCEDURE — 74011250637 HC RX REV CODE- 250/637: Performed by: HOSPITALIST

## 2021-11-02 PROCEDURE — 80048 BASIC METABOLIC PNL TOTAL CA: CPT

## 2021-11-02 PROCEDURE — 85025 COMPLETE CBC W/AUTO DIFF WBC: CPT

## 2021-11-02 PROCEDURE — 82962 GLUCOSE BLOOD TEST: CPT

## 2021-11-02 PROCEDURE — 36415 COLL VENOUS BLD VENIPUNCTURE: CPT

## 2021-11-02 PROCEDURE — 97116 GAIT TRAINING THERAPY: CPT

## 2021-11-02 PROCEDURE — 74011250637 HC RX REV CODE- 250/637: Performed by: INTERNAL MEDICINE

## 2021-11-02 PROCEDURE — 99223 1ST HOSP IP/OBS HIGH 75: CPT | Performed by: INTERNAL MEDICINE

## 2021-11-02 RX ORDER — ATORVASTATIN CALCIUM 80 MG/1
80 TABLET, FILM COATED ORAL
Qty: 30 TABLET | Refills: 0 | Status: SHIPPED | OUTPATIENT
Start: 2021-11-02 | End: 2021-12-02

## 2021-11-02 RX ORDER — GUAIFENESIN 100 MG/5ML
81 LIQUID (ML) ORAL DAILY
Qty: 30 TABLET | Refills: 0 | Status: SHIPPED | OUTPATIENT
Start: 2021-11-03 | End: 2021-12-03

## 2021-11-02 RX ADMIN — ASPIRIN 81 MG CHEWABLE TABLET 81 MG: 81 TABLET CHEWABLE at 08:35

## 2021-11-02 RX ADMIN — APIXABAN 5 MG: 5 TABLET, FILM COATED ORAL at 08:35

## 2021-11-02 NOTE — CONSULTS
Cardiology Initial Patient Referral Note    Cardiology referral request from Dr. Agnes Morrison for evaluation and management/treatment of atrial flutter    Date of  Admission: 10/31/2021 11:01 PM   Primary Care Physician:  Yola Franks MD    Attending Cardiologist: Dr. Andrew Kruse:     -Atrial flutter, new diagnosis in setting of acute CVA. -Echo 11/1/2021: EF 55-60% with normal LV wall motion, normal LV cavity size, mild concentric LVH. -Acute left PCA stroke, presented with acute onset R hemifield loss. Transferred from [facility].  -Carotid artery stenosis, CTA with 68% stenosis to R internal carotid artery, moderate stenosis to R external carotid artery, 62% stenosis to L internal carotid artery. Pt followed by Dr. Meryle Orts at [facility]. -HTN, on Lisinopril and Propranolol as outpatient according to outpatient records.  -Hyperlipidemia, on Pravachol as outpatient  -Peripheral neuropathy, on Gabapentin. Followed by Dr. Nelsy Mcclain as outpatient.  -Recently treated for UTI  -No tobacco use. -FHx negative for heart disease. No primary cardiologist, initial referral completed by Dr. Toyn Montoya       Atrial fibrillation CHADSVASC2 score stroke risk:   70 y.o.                                       72 to 76  +1  male                                        Male     +0  CHF hx                                          No    + 0  HTN hx                                          Yes    +1  Stroke/TIA/Thromboembolism       Yes   +2  Vascular disease hx                      Yes    +1  Diabetes Mellitus                           No    + 0   CHADSVASC2 score                  5      Annual Stroke Risk 7.2% - moderate-high        Plan:     -Started on Eliquis last night per neurology recommendations.  -Pt in atrial flutter on telemetry monitor, rates controlled to bradycardic overnight, records indicate that pt on Propranolol 40 mg BID as outpatient, would discontinue.   Would consider alternate agent for HTN or increasing Lisinopril dose if needed for BP control; BP currently stable without scheduled anti-hypertensives.   -Neurology following, appreciate assistance.  -Could consider testing for sleep apnea as outpatient. Staff addendum:  A.flutter on telemetry, no symptoms. CVA noted with visual changes, improving. NOAC discussed given high risk for recurrent stroke, bleeding risk noted. Discussed potential long term treatment options such as ablation as well. Ok to discharge, no propanolol. I will see as outpatient. I saw, examined, and evaluated the patient. I personally reviewed the patient's labs, tests, vitals, orders, medications, updated history, and other providers assessments. I personally agree with the findings as stated and the plan as documented. Karl Luna MD       History of Present Illness: This is a 70 y.o. male admitted for Acute CVA (cerebrovascular accident) ([facility] [address] [I63.9]. Patient complains of: visual change    Wilbur La is a 70 y.o. male who presented to [facility] ER on 10/29/2021 due to right-sided peripheral vision changes x 1-2 days. Pt traveled to [address] [place] [address] Road from [facility] to go hunting and noticed R-sided blurred vision a couple days prior, however it resolved and subsequently returned day prior to presentation and persisted, thus causing him to seek evaluation. Pt has been diagnosed with acute CVA and transferred to [facility] for further evaluation and treatment. Cardiology has now been consulted due to atrial flutter on telemetry monitoring. He has been started on anticoagulation per neurology recommendations.          Cardiac risk factors: dyslipidemia, male gender, hypertension      Review of Symptoms:  Except as stated above include:  Constitutional:  negative  Respiratory:  negative  Cardiovascular:  negative  Gastrointestinal: negative  Genitourinary:  negative  Musculoskeletal:  Negative  Neurological:  As per HPI  Dermatological: Negative  Endocrinological: Negative  Psychological:  Negative     Past Medical History:   No past medical history on file. Social History:     Social History     Socioeconomic History    Marital status:      Spouse name: Not on file    Number of children: Not on file    Years of education: Not on file    Highest education level: Not on file     Social Determinants of Health     Financial Resource Strain:     Difficulty of Paying Living Expenses:    Food Insecurity:     Worried About Running Out of Food in the Last Year:     [address] in the Last Year:    Transportation Needs:     Lack of Transportation (Medical):  Lack of Transportation (Non-Medical):    Physical Activity:     Days of Exercise per Week:     Minutes of Exercise per Session:    Stress:     Feeling of Stress :    Social Connections:     Frequency of Communication with Friends and Family:     Frequency of Social Gatherings with Friends and Family:     Attends Sikh Services:     Active Member of Clubs or Organizations:     Attends Club or Organization Meetings:     Marital Status:         Family History:   FHs negative for heart disease.      Medications:   No Known Allergies     Current Facility-Administered Medications   Medication Dose Route Frequency    aspirin chewable tablet 81 mg  81 mg Oral DAILY    atorvastatin (LIPITOR) tablet 80 mg  80 mg Oral QHS    apixaban (ELIQUIS) tablet 5 mg  5 mg Oral BID         Physical Exam:     Visit Vitals  /76 (BP 1 Location: Left upper arm, BP Patient Position: At rest;Semi fowlers)   Pulse 64   Temp 98.1 °F (36.7 °C)   Resp 18   Ht 6' (1.829 m)   Wt 103.6 kg (228 lb 6.4 oz)   SpO2 95%   BMI 30.98 kg/m²       TELE: atrial flutter with variable response    BP Readings from Last 3 Encounters:   11/02/21 120/76     Pulse Readings from Last 3 Encounters:   11/02/21 64     Wt Readings from Last 3 Encounters:   11/02/21 103.6 kg (228 lb 6.4 oz)       General:  alert, cooperative, no distress, appears stated age  Neck:  supple  Lungs:  clear to auscultation bilaterally  Heart:  irregularly irregular rhythm  Abdomen:  abdomen is soft without significant tenderness, masses, organomegaly or guarding  Extremities:  atraumatic, no edema  Skin: Warm and dry. Neuro: alert, oriented x3, affect appropriate, moves all extremities well, no involuntary movements  Psych: non focal     Data Review:     Recent Labs     11/02/21  0500   WBC 6.8   HGB 15.5   HCT 47.0        Recent Labs     11/02/21  0500   *   K 4.4      CO2 28   GLU 99   BUN 22*   CREA 0.98   CA 9.8       Results for orders placed or performed during the hospital encounter of 10/31/21   EKG, 12 LEAD, INITIAL   Result Value Ref Range    Ventricular Rate 74 BPM    Atrial Rate 102 BPM    QRS Duration 108 ms    Q-T Interval 412 ms    QTC Calculation (Bezet) 457 ms    Calculated R Axis -60 degrees    Calculated T Axis 48 degrees    Diagnosis       Atrial flutter with variable AV block  Left anterior fascicular block  Septal infarct , age undetermined  Abnormal ECG  No previous ECGs available  Confirmed by April Asencio MD, ----- ([id number]) on 11/1/2021 4:35:58 PM         Cardiographics:     EKG Results     Procedure 720 Value Units Date/Time    EKG, 12 LEAD, INITIAL [[id number]] Collected: 11/01/21 1540    Order Status: Completed Updated: 11/01/21 1636     Ventricular Rate 74 BPM      Atrial Rate 102 BPM      QRS Duration 108 ms      Q-T Interval 412 ms      QTC Calculation (Bezet) 457 ms      Calculated R Axis -60 degrees      Calculated T Axis 48 degrees      Diagnosis --     Atrial flutter with variable AV block  Left anterior fascicular block  Septal infarct , age undetermined  Abnormal ECG  No previous ECGs available  Confirmed by April Asencio MD, ----- ([id number]) on 11/1/2021 4:35:58 PM          10/31/21    ECHO ADULT COMPLETE 11/01/2021 11/1/2021    Interpretation Summary  · LV: Estimated LVEF is 55 - 60%. Visually measured ejection fraction. Normal cavity size and systolic function (ejection fraction normal). Mild concentric hypertrophy. Wall motion: normal.  · AO: Moderate aortic root dilatation. Aortic root diameter = 4.4 cm. Signed by: Carolyn Bronson MD on 11/1/2021  1:17 PM            XR Results (most recent):  Results from [place] encounter on 06/02/14    XR KNEE LT MIN 4 V    Narrative  History: Left knee pain    FINDINGS: AP standing bilateral knees and 3 additional views left knee  obtained. There is minimal medial joint compartment narrowing left knee with  mild spurring intercondylar eminence. Mild posterior patellar spurring. Suggestion of small suprapatellar joint effusion. No evidence of fracture or  dislocation with pronounced atherosclerotic changes distal SFA popliteal and  proximal trifurcation vessels. Additional minimal spurring right-sided  intercondylar eminence with atherosclerotic calcification distal SFA and  popliteal.    Impression  :    1. No acute osseous finding. Very mild degenerative changes left knee most  pronounced patella with small joint effusion. 2. Pronounced atherosclerotic disease bilaterally.         Signed By: Neo Banks PA-C     November 2, 2021

## 2021-11-02 NOTE — DISCHARGE INSTRUCTIONS
Do not take propranolol due to you having low heart rate  Hold lisinopril for blood pressure (top number less than 100)

## 2021-11-02 NOTE — PROGRESS NOTES
Problem: Mobility Impaired (Adult and Pediatric)  Goal: *Acute Goals and Plan of Care (Insert Text)  Description: Physical Therapy Goals  Initiated 11/1/2021 and to be accomplished within 7 day(s)  1. Patient will move from supine to sit and sit to supine in bed with modified independence. 2.  Patient will transfer from bed to chair and chair to bed with modified independence using the least restrictive device. 3.  Patient will perform sit to stand with modified independence. 4.  Patient will ambulate with modified independence for 150 feet with the least restrictive device. 5.  Patient will ascend/descend 10 stairs with handrail(s) with supervision/set-up. PLOF: Independent. Has walking sticks if needed. Lives alone. Has family support. 3 steps to enter two story home; has stair lift for indoor stairs if needed. Outcome: Resolved/Met     PHYSICAL THERAPY TREATMENT AND DISCHARGE    Patient: Lizbeth Mendes (73 y.o. male)  Date: 11/2/2021  Diagnosis: Acute CVA (cerebrovascular accident) [facility]) [I63.9] <principal problem not specified>       Precautions: Fall    ASSESSMENT:  Based on the objective data described below, the patient presents with R homonymous hemianopsia. Pt found sitting up in recliner speaking with son, in NAD, willing to work with PT. He reports he still have R sided visual field cuts. Edu on scanning strategies with mobility. Pt stood with sup and RW and amb 300 ft around the hallways with numerous obstacles on his R side. Pt does well with turing head to look on R side without need for cueing. Pt also ascended/descended 3 steps x2 trials with B HR with step-through gait and steadiness. He returned back to sitting up in recliner with call bell and all needs met. Pt reports his children will be staying with his for a while to assist if needed. Pt not in need of acute PT at this time. Recommend d/c home with [facility] and a RW.        PLAN:  Maximum therapeutic gains met at current level of care and patient will be discharged from physical therapy at this time. Rationale for discharge:  [x]     Goals Achieved  []     [address]  []     Patient not participating in therapy  []     Other:  Discharge Recommendations:  Home Health  Further Equipment Recommendations for Discharge:  rolling walker     SUBJECTIVE:   Patient stated I have gotten better looking towards the right.     OBJECTIVE DATA SUMMARY:   Critical Behavior:  Neurologic State: Alert  Orientation Level: Oriented X4  Cognition: Follows commands  Safety/Judgement: Fall prevention  Functional Mobility Training:        Transfers:  Sit to Stand: Supervision  Stand to Sit: Supervision    Balance:  Sitting: Intact  Standing: Intact; With support   Ambulation/Gait Training:  Distance (ft): 300 Feet (ft)  Assistive Device: Walker, rolling  Ambulation - Level of Assistance: Supervision          Speed/Danette: Accelerated    Stairs:  Number of Stairs Trained: 3 (x2)  Stairs - Level of Assistance: Supervision  Rail Use: Both        Pain:  Pain level pre-treatment: 0/10   Pain level post-treatment: 0/10   Pain Intervention(s): Medication (see MAR); Rest, Ice, Repositioning   Response to intervention: Nurse notified, See doc flow    Activity Tolerance:   Pt tolerated mobility well  Please refer to the flowsheet for vital signs taken during this treatment. After treatment:   [x] Patient left in no apparent distress sitting up in chair  [] Patient left in no apparent distress in bed  [x] Call bell left within reach  [x] Nursing notified  [] Caregiver present  [] Bed alarm activated  [] SCDs applied      COMMUNICATION/EDUCATION:   [x]         Role of Physical Therapy in the acute care setting. [x]         Fall prevention education was provided and the patient/caregiver indicated understanding. [x]         Patient/family have participated as able and agree with findings and recommendations.   []         Patient is unable to participate in plan of care at this time.   []         Other:        Earlene Dickey   Time Calculation: 12 mins

## 2021-11-03 ENCOUNTER — TELEPHONE (OUTPATIENT)
Dept: CARDIOLOGY CLINIC | Age: 76
End: 2021-11-03

## 2021-11-03 NOTE — TELEPHONE ENCOUNTER
Left message to for patient to call office to schedule a 4 week post hospital w/[facility] for aflutter.

## 2021-11-03 NOTE — DISCHARGE SUMMARY
[facility]  [facility]       Patient: Darren Dupree Age: 70 y.o. : 1945 MR#: [id number] SSN: [id number]  PCP on record: Yola Franks MD  Admit date: 10/31/2021  Discharge date: 11/3/2021    Consults:  -EDUAR Saldana,-cardiology  - ARNOLDO Jeffery,-neuroloy  Procedures: None  -     Significant Diagnostic Studies: -Cardiac eco 21:  Result status: Final result  · LV: Estimated LVEF is 55 - 60%. Visually measured ejection fraction. Normal cavity size and systolic function (ejection fraction normal). Mild concentric hypertrophy. Wall motion: normal.  · AO: Moderate aortic root dilatation. Aortic root diameter = 4.4 cm.      - MRI brain wo cont 21:  IMPRESSION  1. Cortical and subcortical infarct/ischemia left parasagittal occipital lobe,  with minimal regional mass effect and edema as above.     2. Additional area infarct/ischemia involving the posterior superior left  thalamus/basal ganglia region as above.     3. Mild global volume loss and hemispheric small vessel disease change for age  otherwise   Discharge Diagnoses:   Acute ischemic stroke w/ visual field cut  New diagnosis of a flutter  Finding of moderate aortic root dilatation on cardiac echo                                      Patient Active Problem List   Diagnosis Code    Acute CVA (cerebrovascular accident) [facility]) I63.9       Hospital Course by Problem     68-year-old male admitted after he presented to the emergency room with right-sided peripheral vision loss for the 1 to 2 days prior to the date of presentation.     -Acute ischemic stroke with finding of ischemic changes in the left occipital lobe with minimal regional mass-effect and edema also finding of right additional infarct area involving the posterior superior left thalamus/basal ganglia. Patient continued to have the visual field cut. Otherwise he had no prominent neurological complaints.   He was evaluated by the neurologist and initially started on high-dose statin and DAPT. However, he was noted to have evidence of a flutter which is a new diagnosis and the Plavix was stopped and he was switched to Eliquis. -Afib/flutter: new dx, seen on tele. Rate controlled. Evaluated by the cardiology team.  Cardiac echo showed moderate aortic root dilatation with aortic root diameter of 4.4 cm. Cardiology consultant agreed with NOAC initiation. Patient to follow-up with the cardiology team for monitoring of the aortic root dilatation and management of atrial flutter. He was noted to be bradycardic for the most part here and cardiologist has recommended that patient not take propranolol. Patient was not sure but stated that he had stopped taking propranolol for some time prior to his admission here. -Hypertension: Patient was noted to have blood pressure in the low 462Z systolic. He has been advised to measure his blood pressure at home and take his blood pressure medication for systolic above 962. Patient has expressed understanding. Today's examination of the patient revealed:     Subjective:   Patient was seen with the son and his room. He was seen sitting on a chair beside his bed. He had no complaints and appeared comfortable.   Objective:   VS:   Visit Vitals  /76 (BP 1 Location: Right upper arm, BP Patient Position: Sitting)   Pulse 68   Temp 98 °F (36.7 °C)   Resp 18   Ht 6' (1.829 m)   Wt 103.6 kg (228 lb 6.4 oz)   SpO2 97%   BMI 30.98 kg/m²      Tmax/24hrs: Temp (24hrs), Av °F (36.7 °C), Min:98 °F (36.7 °C), Max:98 °F (36.7 °C)     Input/Output:     Intake/Output Summary (Last 24 hours) at 11/3/2021 0837  Last data filed at 2021 0912  Gross per 24 hour   Intake 700 ml   Output    Net 700 ml       General:  Alert, awake, in NAD  Cardiovascular: irregular rate rhythm, no murmurs  Pulmonary:  CTAB  GI:  abd soft, nt, nd  Extremities:  No edema, distal lower ext muscle atrophy bilaterally  Additional:  Right visual field cut, otw no focal neuro finings    Labs:    Recent Results (from the past 24 hour(s))   GLUCOSE, POC    Collection Time: 11/02/21 10:56 AM   Result Value Ref Range    Glucose (POC) 105 70 - 110 mg/dL     Additional Data Reviewed:     Condition on discharge:stable   Disposition:    []Home   [x]Home with Home Health   []SNF/NH   []Rehab   []Home with family   []Alternate Facility:____________________      Discharge Medications:   Cannot display discharge medications since this patient is not currently admitted. Follow-up Appointments:   1. Your PCP: Yola Franks MD, within 7-10days  2.  Cardiologist in one week        >30 minutes spent coordinating this discharge (review instructions/follow-up, prescriptions, preparing report for sign off)    Signed:  Whitley Oswald MD  11/3/2021  8:37 AM

## 2021-12-08 ENCOUNTER — TELEPHONE (OUTPATIENT)
Dept: CARDIOLOGY CLINIC | Age: 76
End: 2021-12-08

## 2021-12-08 NOTE — TELEPHONE ENCOUNTER
Called patient . He stated that he is following care with another provider the week of 12-. He did not want to schedule this appointment with Dr Rob Ann at this time.

## 2021-12-08 NOTE — TELEPHONE ENCOUNTER
----- Message from Octavio Reynolds sent at 11/3/2021  7:36 AM EDT -----  Regarding: FW: Followup apt  FYI  ----- Message -----  From: Li Perez MD  Sent: 11/2/2021   2:03 PM EDT  To: Octavio Reynolds  Subject: Followup apt                                     Followup [facility] 4 weeks, seen for a.flutter/cva    Thx,  Dr. Mira Durán

## 2022-02-24 ENCOUNTER — OFFICE VISIT (OUTPATIENT)
Dept: CARDIOLOGY CLINIC | Age: 77
End: 2022-02-24
Payer: MEDICARE

## 2022-02-24 VITALS — HEIGHT: 72 IN | OXYGEN SATURATION: 98 % | BODY MASS INDEX: 31.97 KG/M2 | WEIGHT: 236 LBS

## 2022-02-24 DIAGNOSIS — I49.5 SICK SINUS SYNDROME (HCC): Primary | ICD-10-CM

## 2022-02-24 DIAGNOSIS — Z79.01 ON APIXABAN THERAPY: ICD-10-CM

## 2022-02-24 DIAGNOSIS — E78.5 HYPERLIPIDEMIA, UNSPECIFIED HYPERLIPIDEMIA TYPE: ICD-10-CM

## 2022-02-24 DIAGNOSIS — I45.5 SINUS PAUSE: ICD-10-CM

## 2022-02-24 DIAGNOSIS — I48.91 ATRIAL FIBRILLATION, UNSPECIFIED TYPE (HCC): ICD-10-CM

## 2022-02-24 DIAGNOSIS — I63.9 ACUTE CVA (CEREBROVASCULAR ACCIDENT) (HCC): ICD-10-CM

## 2022-02-24 DIAGNOSIS — I10 PRIMARY HYPERTENSION: ICD-10-CM

## 2022-02-24 PROBLEM — G62.9 NEUROPATHY: Status: ACTIVE | Noted: 2018-03-26

## 2022-02-24 PROCEDURE — 3017F COLORECTAL CA SCREEN DOC REV: CPT | Performed by: INTERNAL MEDICINE

## 2022-02-24 PROCEDURE — G8417 CALC BMI ABV UP PARAM F/U: HCPCS | Performed by: INTERNAL MEDICINE

## 2022-02-24 PROCEDURE — 93000 ELECTROCARDIOGRAM COMPLETE: CPT | Performed by: INTERNAL MEDICINE

## 2022-02-24 PROCEDURE — 99215 OFFICE O/P EST HI 40 MIN: CPT | Performed by: INTERNAL MEDICINE

## 2022-02-24 PROCEDURE — G8427 DOCREV CUR MEDS BY ELIG CLIN: HCPCS | Performed by: INTERNAL MEDICINE

## 2022-02-24 PROCEDURE — G8536 NO DOC ELDER MAL SCRN: HCPCS | Performed by: INTERNAL MEDICINE

## 2022-02-24 PROCEDURE — 1101F PT FALLS ASSESS-DOCD LE1/YR: CPT | Performed by: INTERNAL MEDICINE

## 2022-02-24 PROCEDURE — G8510 SCR DEP NEG, NO PLAN REQD: HCPCS | Performed by: INTERNAL MEDICINE

## 2022-02-24 PROCEDURE — G8756 NO BP MEASURE DOC: HCPCS | Performed by: INTERNAL MEDICINE

## 2022-02-24 RX ORDER — ERGOCALCIFEROL 1.25 MG/1
50000 CAPSULE ORAL
COMMUNITY

## 2022-02-24 RX ORDER — DULOXETIN HYDROCHLORIDE 30 MG/1
30 CAPSULE, DELAYED RELEASE ORAL DAILY
COMMUNITY
Start: 2022-02-16

## 2022-02-24 RX ORDER — LISINOPRIL 5 MG/1
5 TABLET ORAL DAILY
COMMUNITY
Start: 2022-01-25

## 2022-02-24 RX ORDER — OMEGA-3 FATTY ACIDS/FISH OIL 300-500 MG
CAPSULE ORAL
COMMUNITY

## 2022-02-24 RX ORDER — FINASTERIDE 5 MG/1
5 TABLET, FILM COATED ORAL DAILY
COMMUNITY
Start: 2021-12-27

## 2022-02-24 RX ORDER — ATORVASTATIN CALCIUM 80 MG/1
80 TABLET, FILM COATED ORAL DAILY
COMMUNITY
Start: 2021-11-02

## 2022-02-24 RX ORDER — TAMSULOSIN HYDROCHLORIDE 0.4 MG/1
0.4 CAPSULE ORAL DAILY
COMMUNITY
Start: 2022-02-21

## 2022-02-24 RX ORDER — GABAPENTIN 600 MG/1
1200 TABLET ORAL
COMMUNITY
Start: 2022-01-11

## 2022-02-24 RX ORDER — GUAIFENESIN 100 MG/5ML
81 LIQUID (ML) ORAL DAILY
COMMUNITY

## 2022-02-24 RX ORDER — SODIUM CHLORIDE 0.9 % (FLUSH) 0.9 %
5-40 SYRINGE (ML) INJECTION AS NEEDED
Status: CANCELLED | OUTPATIENT
Start: 2022-02-24

## 2022-02-24 RX ORDER — SODIUM CHLORIDE 0.9 % (FLUSH) 0.9 %
5-40 SYRINGE (ML) INJECTION EVERY 8 HOURS
Status: CANCELLED | OUTPATIENT
Start: 2022-02-24

## 2022-02-24 RX ORDER — VITAMIN E 268 MG
800 CAPSULE ORAL DAILY
COMMUNITY

## 2022-02-24 RX ORDER — BISMUTH SUBSALICYLATE 262 MG
1 TABLET,CHEWABLE ORAL DAILY
COMMUNITY

## 2022-02-24 NOTE — LETTER
2/24/2022 9:07 AM    John Pham  [id number]  1945        Insurance:  Medicare                  Auth # _____________________      Proc Date: Mon 3/7                Proc Time:  11:45      Performing MD : Dr. Galina López                      Procedure:Dual Pacemaker                                         Scheduled with:  Manjeet Alvares                                               Date:2/24/2022          HP: 2/24      EKG: ______    Labs:______  CXR: _______  Orders:  2/24          Special Instructions:  _____________________________________________________  ______________________________________________________________________  ______________________________________________________________________          The materials enclosed with this facsimile transmission are private and confidential and are the property of the sender. If you are not the intended recipient, be advised that any unauthorized use, disclosure, copying, distribution, or the taking of any action in reliance on the contents of this telecopied information is strictly prohibited. If you have received this in error, please immediately notify the sender via telephone to arrange for return of the forwarded documentation.

## 2022-02-24 NOTE — PROGRESS NOTES
Shukri Dos Santos presents today for   Chief Complaint   Patient presents with    New Patient     referred by Jacy Hallman for possible ablation        Shukrigreg Dos Santos preferred language for health care discussion is english/other. Is someone accompanying this pt? no    Is the patient using any DME equipment during [address]? no    Depression Screening:  3 most recent PHQ Screens 2/24/2022   Little interest or pleasure in doing things Not at all   Feeling down, depressed, irritable, or hopeless Not at all   Total Score PHQ 2 0       Learning Assessment:  Learning Assessment 2/24/2022   PRIMARY LEARNER Patient   PRIMARY LANGUAGE ENGLISH   LEARNER PREFERENCE PRIMARY DEMONSTRATION   ANSWERED BY Patient   RELATIONSHIP SELF       Abuse Screening:  Abuse Screening Questionnaire 2/24/2022   Do you ever feel afraid of your partner? N   Are you in a relationship with someone who physically or mentally threatens you? N   Is it safe for you to go home? Y       Fall Risk  Fall Risk Assessment, last 12 mths 2/24/2022   Able to walk? Yes   Fall in past 12 months? 0   Do you feel unsteady? 0   Are you worried about falling 0       Pt currently taking Anticoagulant therapy? ASA 81mg every day and Eliquis     Coordination of Care:  1. Have you been to the ER, urgent care clinic since your last visit? Hospitalized since your last visit? no    2. Have you seen or consulted any other health care providers outside of the [address] [place] [address] since your last visit? Include any pap smears or colon screening.  no

## 2022-02-24 NOTE — PROGRESS NOTES
History of Present Illness:  78year old male here for follow up. I initially saw him in the hospital November 2nd, 2021 and he then followed up with Dr. Manuel Olszewski. His Propranolol was discontinued at discharge and follow up event monitor showed paroxysmal atrial flutter, pauses up to 4.8 seconds, and short runs of suspected aberrancy. He has no symptoms of chest pain, dyspnea, presyncope, syncope, PND, orthopnea or edema. He is taking Eliquis, which was started in November. He presented 10/29/21 to [facility] due to right sided peripheral vision changes. He eventually was admitted to [facility], found to have acute left posterior circulation stroke with right sided anmol field visual loss. He was also slightly bradycardic, found to have atrial flutter. Impression:  1. History of stroke, left PCA region with right visual loss October, 2021 due to atrial flutter. 2. New diagnosis of atrial flutter in the setting of acute stroke October, 2021.  3. History of carotid stenosis with CTA showing 68% stenosis on the right, moderate on the left, about 62%. 4. Echo November, 2021 with normal EF. 5. HTN. 6. Sick sinus syndrome, irreversible, with pauses up to 4.8 seconds and need for long term AV blocking agents. 7. Peripheral neuropathy. 8. History of previous tobacco use. Plan:  I had a lengthy discussion about his diagnosis of atrial flutter, as well as bradycardia. He has no symptoms, but given the significant pauses and wide fluctuations of heart rate, I recommended a dual chamber pacemaker. Risks, benefits and alternatives discussed. All questions answered and we will proceed. Plan to hold Eliquis 48 hours prior. He had been on Propranolol up until last November and once his pacemaker is in place, I would like to restart the beta blocker and monitor.   If he has more breakthrough episodes of atrial flutter, it would be reasonable to consider right sided ablation, but this will not change his need for long term anticoagulation. All questions answered and I will make arrangements. Thank you kindly for allowing me to participate in this patient's care. No past medical history on file. Current Outpatient Medications   Medication Sig Dispense Refill    apixaban (Eliquis) 5 mg tablet Take 5 mg by mouth two (2) times a day.  aspirin 81 mg chewable tablet Take 81 mg by mouth daily.  atorvastatin (LIPITOR) 80 mg tablet Take 80 mg by mouth daily.  DULoxetine (CYMBALTA) 30 mg capsule Take 30 mg by mouth daily.  ergocalciferol (ERGOCALCIFEROL) 1,250 mcg (50,000 unit) capsule Take 50,000 Units by mouth every seven (7) days.  finasteride (PROSCAR) 5 mg tablet Take 5 mg by mouth daily.  lisinopriL (PRINIVIL, ZESTRIL) 5 mg tablet Take 5 mg by mouth daily.  tamsulosin (FLOMAX) 0.4 mg capsule Take 0.4 mg by mouth daily.  gabapentin (NEURONTIN) 600 mg tablet Take 1,200 mg by mouth Before breakfast and dinner.  vitamin E (AQUA GEMS) 400 unit capsule Take 800 Units by mouth daily.  multivitamin (ONE A DAY) tablet Take 1 Tablet by mouth daily.  fish oil-omega-3 fatty acids (Fish OiL) 300-500 mg cap Take  by mouth. Social History   has no history on file for tobacco use.   has no history on file for alcohol use. Family History  family history is not on file. Review of Systems  Except as stated above include:  Constitutional: Negative for fever, chills and malaise/fatigue. HEENT: No congestion or recent URI. Gastrointestinal: No nausea, vomiting, abdominal pain, bloody stools. Pulmonary:  Negative except as stated above. Cardiac:  Negative except as stated above. Musculoskeletal: Negative except as stated above. Neurological:  No localized symptoms. Skin:  Negative except as stated above. Psych:  Negative except as stated above. Endocrine:  Negative except as stated above.     PHYSICAL EXAM  BP Readings from Last 3 Encounters: 11/02/21 119/76     Pulse Readings from Last 3 Encounters:   11/02/21 68     Wt Readings from Last 3 Encounters:   02/24/22 107 kg (236 lb)   11/02/21 103.6 kg (228 lb 6.4 oz)     General:   Well developed, well groomed. Head/Neck:   No obvious jugular venous distention     No obvious carotid pulsations. No evidence of xanthelasma. Lungs:   No respiratory distress. Clear bilaterally. Heart:  Regular rate and rhythm. Normal S1/S2. Palpation grossly normal.    No significant murmurs, rubs or gallops. Abdomen:   Non-acute abdomen. No obvious pulsations. Extremities:   Intact peripheral pulses. No significant edema. Neurological:   Alert and oriented to person, place, time. No focal neurological deficit visually. Skin:   No obvious rash    Blood Pressure Metric:  Monitor recommended and adjustments stated if needed.

## 2022-02-24 NOTE — PATIENT INSTRUCTIONS
[facility]          Patient  EP Instructions      Please have your lab work and chest x-ray done March 1, 2022 approximately. Please have covid test done at [facility] on Friday, March 4, 2022 between 1-3 p.m.      1. You are scheduled to have a Dual Chamber pacemaker on  March 7, 2022 , at 11:45 a.m. Maryan Rarhys Please check in at 10:15 a.m.     2. Please go to [facility] and park in the outpatient parking lot that is located around to the back of the hospital and enter through the [facility]. Once you enter through the [facility] check in with the  there. The  will either give you directions or assist you in getting to the cath holding area. 3.  You are not to eat or drink anything after midnight the night before your procedure. 4. Please continue to take your medications with a small sip of water on the morning of the procedure with the following exceptions:  Hold Eliquis for 48 hours before procedure. 5. If you are diabetic, do not take your insulin/sugar pill the morning of the procedure. 6. We encourage families to wait in the waiting room on the first floor while the procedure is being done. The Doctor will come out and talk with you as soon as the procedure is over. 7. There is the possibility that you may spend the night in the hospital, depending on the results of the procedure. This will be determined after the procedure is done. 8.   If you or your family have any questions, please call our office Monday-Friday 9:00am         -4:30 pm , at [phone], and ask to speak to one of the nurses.     9.   Please have Covid 19 testing done on March 4, 2022 at [facility] between the hours of 1:00 pm to 3:00 pm.     Follow up appointment for wound check on March 17, 2022 at 10:15 in office

## 2022-03-01 ENCOUNTER — HOSPITAL ENCOUNTER (OUTPATIENT)
Dept: LAB | Age: 77
Discharge: HOME | End: 2022-03-01
Payer: MEDICARE

## 2022-03-01 ENCOUNTER — HOSPITAL ENCOUNTER (OUTPATIENT)
Dept: PREADMISSION TESTING | Age: 77
Discharge: HOME | End: 2022-03-01
Payer: MEDICARE

## 2022-03-01 ENCOUNTER — HOSPITAL ENCOUNTER (OUTPATIENT)
Dept: GENERAL RADIOLOGY | Age: 77
Discharge: HOME | End: 2022-03-01
Attending: INTERNAL MEDICINE
Payer: MEDICARE

## 2022-03-01 DIAGNOSIS — I45.5 SINUS PAUSE: ICD-10-CM

## 2022-03-01 LAB
ALBUMIN SERPL-MCNC: 4.2 G/DL (ref 3.4–5)
ALBUMIN/GLOB SERPL: 1.4 {RATIO} (ref 0.8–1.7)
ALP SERPL-CCNC: 91 U/L (ref 45–117)
ALT SERPL-CCNC: 45 U/L (ref 16–61)
ANION GAP SERPL CALC-SCNC: 4 MMOL/L (ref 3–18)
AST SERPL-CCNC: 27 U/L (ref 10–38)
BASOPHILS # BLD: 0 K/UL (ref 0–0.1)
BASOPHILS NFR BLD: 0 % (ref 0–2)
BILIRUB SERPL-MCNC: 0.7 MG/DL (ref 0.2–1)
BUN SERPL-MCNC: 27 MG/DL (ref 7–18)
BUN/CREAT SERPL: 30 (ref 12–20)
CALCIUM SERPL-MCNC: 9.5 MG/DL (ref 8.5–10.1)
CHLORIDE SERPL-SCNC: 105 MMOL/L (ref 100–111)
CO2 SERPL-SCNC: 28 MMOL/L (ref 21–32)
CREAT SERPL-MCNC: 0.9 MG/DL (ref 0.6–1.3)
DIFFERENTIAL METHOD BLD: ABNORMAL
EOSINOPHIL # BLD: 0.2 K/UL (ref 0–0.4)
EOSINOPHIL NFR BLD: 4 % (ref 0–5)
ERYTHROCYTE [DISTWIDTH] IN BLOOD BY AUTOMATED COUNT: 13.4 % (ref 11.6–14.5)
GLOBULIN SER CALC-MCNC: 3.1 G/DL (ref 2–4)
GLUCOSE SERPL-MCNC: 105 MG/DL (ref 74–99)
HCT VFR BLD AUTO: 47.5 % (ref 36–48)
HGB BLD-MCNC: 15 G/DL (ref 13–16)
IMM GRANULOCYTES # BLD AUTO: 0 K/UL (ref 0–0.04)
IMM GRANULOCYTES NFR BLD AUTO: 0 % (ref 0–0.5)
INR PPP: 1.2 (ref 0.8–1.2)
LYMPHOCYTES # BLD: 1 K/UL (ref 0.9–3.6)
LYMPHOCYTES NFR BLD: 18 % (ref 21–52)
MCH RBC QN AUTO: 28.5 PG (ref 24–34)
MCHC RBC AUTO-ENTMCNC: 31.6 G/DL (ref 31–37)
MCV RBC AUTO: 90.3 FL (ref 78–100)
MONOCYTES # BLD: 0.4 K/UL (ref 0.05–1.2)
MONOCYTES NFR BLD: 7 % (ref 3–10)
NEUTS SEG # BLD: 3.9 K/UL (ref 1.8–8)
NEUTS SEG NFR BLD: 71 % (ref 40–73)
NRBC # BLD: 0 K/UL (ref 0–0.01)
NRBC BLD-RTO: 0 PER 100 WBC
PLATELET # BLD AUTO: 188 K/UL (ref 135–420)
PMV BLD AUTO: 10.1 FL (ref 9.2–11.8)
POTASSIUM SERPL-SCNC: 4.9 MMOL/L (ref 3.5–5.5)
PROT SERPL-MCNC: 7.3 G/DL (ref 6.4–8.2)
PROTHROMBIN TIME: 14.6 SEC (ref 11.5–15.2)
RBC # BLD AUTO: 5.26 M/UL (ref 4.35–5.65)
SARS-COV-2, NAA: NOT DETECTED
SODIUM SERPL-SCNC: 137 MMOL/L (ref 136–145)
WBC # BLD AUTO: 5.5 K/UL (ref 4.6–13.2)

## 2022-03-01 PROCEDURE — 85025 COMPLETE CBC W/AUTO DIFF WBC: CPT

## 2022-03-01 PROCEDURE — 36415 COLL VENOUS BLD VENIPUNCTURE: CPT

## 2022-03-01 PROCEDURE — 71046 X-RAY EXAM CHEST 2 VIEWS: CPT

## 2022-03-01 PROCEDURE — 85610 PROTHROMBIN TIME: CPT

## 2022-03-01 PROCEDURE — U0003 INFECTIOUS AGENT DETECTION BY NUCLEIC ACID (DNA OR RNA); SEVERE ACUTE RESPIRATORY SYNDROME CORONAVIRUS 2 (SARS-COV-2) (CORONAVIRUS DISEASE [COVID-19]), AMPLIFIED PROBE TECHNIQUE, MAKING USE OF HIGH THROUGHPUT TECHNOLOGIES AS DESCRIBED BY CMS-2020-01-R: HCPCS

## 2022-03-01 PROCEDURE — 80053 COMPREHEN METABOLIC PANEL: CPT

## 2022-03-03 NOTE — H&P
Plan dual chamber pacemaker implantation, then restart BB. Pertinent risks, benefits, alternatives discussed. Plan moderate sedation if anesthesiologist is not available. Risks include emergent intubation as well as possibly inability to intubate. There can be exacerbation of lung and heart disease including but not limited to heart failure and COPD/asthma. Risks include but are not limited to acute and chronic pain, infection, bleeding, deep venous thrombosis with chronic swelling of extremity, pulmonary embolism, anesthesia reactions, pneumothorax, hemothorax, emergent open heart surgery, need for repeat surgery to replace/reposition leads, and death. Given the possible movement/manipulation of shoulder and arm after the procedure, there is a chance a 2nd or 3rd procedure could be necessary soon after the first, within hours to weeks to reposition or replace a lead or generator. There can be a prolonged hospitalization with brain damage and reduced or compromised long term mobility. By stating these are possible risks, this does not exclude the potential for additional risks not named here. All questions answered. If not MRI compatible device, I discussed inability to have future MRI scans but CT scans are acceptable. History of Present Illness:  78year old male here for follow up. I initially saw him in the hospital November 2nd, 2021 and he then followed up with Dr. Nicho Willingham. His Propranolol was discontinued at discharge and follow up event monitor showed paroxysmal atrial flutter, pauses up to 4.8 seconds, and short runs of suspected aberrancy. He has no symptoms of chest pain, dyspnea, presyncope, syncope, PND, orthopnea or edema. He is taking Eliquis, which was started in November.     He presented 10/29/21 to [facility] due to right sided peripheral vision changes.  He eventually was admitted to [facility], found to have acute left posterior circulation stroke with right sided anmol field visual loss. He was also slightly bradycardic, found to have atrial flutter.     Impression:  1. History of stroke, left PCA region with right visual loss October, 2021 due to atrial flutter. 2. New diagnosis of atrial flutter in the setting of acute stroke October, 2021.  3. History of carotid stenosis with CTA showing 68% stenosis on the right, moderate on the left, about 62%. 4. Echo November, 2021 with normal EF. 5. HTN. 6. Sick sinus syndrome, irreversible, with pauses up to 4.8 seconds and need for long term AV blocking agents. 7. Peripheral neuropathy. 8. History of previous tobacco use.     Plan:  I had a lengthy discussion about his diagnosis of atrial flutter, as well as bradycardia. He has no symptoms, but given the significant pauses and wide fluctuations of heart rate, I recommended a dual chamber pacemaker. Risks, benefits and alternatives discussed. All questions answered and we will proceed. Plan to hold Eliquis 48 hours prior.     He had been on Propranolol up until last November and once his pacemaker is in place, I would like to restart the beta blocker and monitor. If he has more breakthrough episodes of atrial flutter, it would be reasonable to consider right sided ablation, but this will not change his need for long term anticoagulation.  All questions answered and I will make arrangements.     Thank you kindly for allowing me to participate in this patient's care.     No past medical history on file.            Current Outpatient Medications   Medication Sig Dispense Refill    apixaban (Eliquis) 5 mg tablet Take 5 mg by mouth two (2) times a day.        aspirin 81 mg chewable tablet Take 81 mg by mouth daily.        atorvastatin (LIPITOR) 80 mg tablet Take 80 mg by mouth daily.        DULoxetine (CYMBALTA) 30 mg capsule Take 30 mg by mouth daily.        ergocalciferol (ERGOCALCIFEROL) 1,250 mcg (50,000 unit) capsule Take 50,000 Units by mouth every seven (7) days.        finasteride (PROSCAR) 5 mg tablet Take 5 mg by mouth daily.        lisinopriL (PRINIVIL, ZESTRIL) 5 mg tablet Take 5 mg by mouth daily.        tamsulosin (FLOMAX) 0.4 mg capsule Take 0.4 mg by mouth daily.        gabapentin (NEURONTIN) 600 mg tablet Take 1,200 mg by mouth Before breakfast and dinner.        vitamin E (AQUA GEMS) 400 unit capsule Take 800 Units by mouth daily.        multivitamin (ONE A DAY) tablet Take 1 Tablet by mouth daily.        fish oil-omega-3 fatty acids (Fish OiL) 300-500 mg cap Take  by mouth.             Social History   has no history on file for tobacco use.   has no history on file for alcohol use.     Family History  family history is not on file.     Review of Systems  Except as stated above include:  Constitutional: Negative for fever, chills and malaise/fatigue. HEENT: No congestion or recent URI. Gastrointestinal: No nausea, vomiting, abdominal pain, bloody stools. Pulmonary:  Negative except as stated above. Cardiac:  Negative except as stated above. Musculoskeletal: Negative except as stated above. Neurological:  No localized symptoms. Skin:  Negative except as stated above. Psych:  Negative except as stated above. Endocrine:  Negative except as stated above.     PHYSICAL EXAM      BP Readings from Last 3 Encounters:   11/02/21 119/76          Pulse Readings from Last 3 Encounters:   11/02/21 68          Wt Readings from Last 3 Encounters:   02/24/22 107 kg (236 lb)   11/02/21 103.6 kg (228 lb 6.4 oz)      General:          Well developed, well groomed. Head/Neck:     No obvious jugular venous distention                           No obvious carotid pulsations. No evidence of xanthelasma. Lungs:             No respiratory distress. Clear bilaterally. Heart:              Regular rate and rhythm. Normal S1/S2.                             Palpation grossly normal.                          No significant murmurs, rubs or gallops. Abdomen:        Non-acute abdomen. No obvious pulsations. Extremities:     Intact peripheral pulses. No significant edema. Neurological:   Alert and oriented to person, place, time. No focal neurological deficit visually.   Skin:                No obvious rash     Blood Pressure Metric:  Monitor recommended and adjustments stated if needed.         Electronically signed by Charlee Chaney MD at 02/24/22 1006

## 2022-03-07 ENCOUNTER — APPOINTMENT (OUTPATIENT)
Dept: GENERAL RADIOLOGY | Age: 77
End: 2022-03-07
Attending: INTERNAL MEDICINE
Payer: MEDICARE

## 2022-03-07 ENCOUNTER — HOSPITAL ENCOUNTER (OUTPATIENT)
Age: 77
Setting detail: OUTPATIENT SURGERY
Discharge: HOME | End: 2022-03-07
Attending: INTERNAL MEDICINE | Admitting: INTERNAL MEDICINE
Payer: MEDICARE

## 2022-03-07 ENCOUNTER — ANESTHESIA EVENT (OUTPATIENT)
Dept: CARDIAC CATH/INVASIVE PROCEDURES | Age: 77
End: 2022-03-07
Payer: MEDICARE

## 2022-03-07 ENCOUNTER — ANESTHESIA (OUTPATIENT)
Dept: CARDIAC CATH/INVASIVE PROCEDURES | Age: 77
End: 2022-03-07
Payer: MEDICARE

## 2022-03-07 VITALS
WEIGHT: 235 LBS | HEIGHT: 72 IN | RESPIRATION RATE: 15 BRPM | DIASTOLIC BLOOD PRESSURE: 73 MMHG | HEART RATE: 58 BPM | SYSTOLIC BLOOD PRESSURE: 147 MMHG | BODY MASS INDEX: 31.83 KG/M2 | OXYGEN SATURATION: 97 %

## 2022-03-07 DIAGNOSIS — R00.1 SINUS BRADYCARDIA: ICD-10-CM

## 2022-03-07 DIAGNOSIS — I63.9 ACUTE CVA (CEREBROVASCULAR ACCIDENT) (HCC): ICD-10-CM

## 2022-03-07 DIAGNOSIS — Z95.0 PACEMAKER: Primary | ICD-10-CM

## 2022-03-07 DIAGNOSIS — I48.0 PAROXYSMAL ATRIAL FIBRILLATION (HCC): ICD-10-CM

## 2022-03-07 PROCEDURE — 33208 INSRT HEART PM ATRIAL & VENT: CPT | Performed by: INTERNAL MEDICINE

## 2022-03-07 PROCEDURE — 77030018729 HC ELECTRD DEFIB PAD CARD -B: Performed by: INTERNAL MEDICINE

## 2022-03-07 PROCEDURE — 76060000033 HC ANESTHESIA 1 TO 1.5 HR: Performed by: INTERNAL MEDICINE

## 2022-03-07 PROCEDURE — 74011250636 HC RX REV CODE- 250/636: Performed by: ANESTHESIOLOGY

## 2022-03-07 PROCEDURE — 77030031139 HC SUT VCRL2 J&J -A: Performed by: INTERNAL MEDICINE

## 2022-03-07 PROCEDURE — C1893 INTRO/SHEATH, FIXED,NON-PEEL: HCPCS | Performed by: INTERNAL MEDICINE

## 2022-03-07 PROCEDURE — 77030002933 HC SUT MCRYL J&J -A: Performed by: INTERNAL MEDICINE

## 2022-03-07 PROCEDURE — C1898 LEAD, PMKR, OTHER THAN TRANS: HCPCS | Performed by: INTERNAL MEDICINE

## 2022-03-07 PROCEDURE — 77030019580 HC CBL PACE MEDT -B: Performed by: INTERNAL MEDICINE

## 2022-03-07 PROCEDURE — 71045 X-RAY EXAM CHEST 1 VIEW: CPT

## 2022-03-07 PROCEDURE — 77030002996 HC SUT SLK J&J -A: Performed by: INTERNAL MEDICINE

## 2022-03-07 PROCEDURE — 99100 ANES PT EXTEME AGE<1 YR&>70: CPT | Performed by: ANESTHESIOLOGY

## 2022-03-07 PROCEDURE — 00530 ANES PERM TRANSVNS PM INSJ: CPT | Performed by: ANESTHESIOLOGY

## 2022-03-07 PROCEDURE — 77030040375: Performed by: INTERNAL MEDICINE

## 2022-03-07 PROCEDURE — 74011250636 HC RX REV CODE- 250/636: Performed by: INTERNAL MEDICINE

## 2022-03-07 PROCEDURE — 74011000636 HC RX REV CODE- 636: Performed by: INTERNAL MEDICINE

## 2022-03-07 PROCEDURE — C1785 PMKR, DUAL, RATE-RESP: HCPCS | Performed by: INTERNAL MEDICINE

## 2022-03-07 PROCEDURE — 74011000250 HC RX REV CODE- 250: Performed by: INTERNAL MEDICINE

## 2022-03-07 DEVICE — IPG W1DR01 AZURE XT DR MRI USA
Type: IMPLANTABLE DEVICE | Status: FUNCTIONAL
Brand: AZURE™ XT DR MRI SURESCAN™

## 2022-03-07 DEVICE — LEAD 407652 CAPSUREFIX NOVUS US MRI
Type: IMPLANTABLE DEVICE | Status: FUNCTIONAL
Brand: CAPSUREFIX NOVUS MRI™ SURESCAN™

## 2022-03-07 DEVICE — LEAD 407658 CAPSUREFIX NOVUS US MRI
Type: IMPLANTABLE DEVICE | Status: FUNCTIONAL
Brand: CAPSUREFIX NOVUS MRI™ SURESCAN™

## 2022-03-07 RX ORDER — OXYCODONE AND ACETAMINOPHEN 5; 325 MG/1; MG/1
1 TABLET ORAL
Qty: 12 TABLET | Refills: 0 | Status: SHIPPED | OUTPATIENT
Start: 2022-03-07 | End: 2022-03-10

## 2022-03-07 RX ORDER — SODIUM CHLORIDE 0.9 % (FLUSH) 0.9 %
5-40 SYRINGE (ML) INJECTION EVERY 8 HOURS
Status: DISCONTINUED | OUTPATIENT
Start: 2022-03-07 | End: 2022-03-07 | Stop reason: HOSPADM

## 2022-03-07 RX ORDER — LIDOCAINE HYDROCHLORIDE 10 MG/ML
INJECTION, SOLUTION EPIDURAL; INFILTRATION; INTRACAUDAL; PERINEURAL AS NEEDED
Status: DISCONTINUED | OUTPATIENT
Start: 2022-03-07 | End: 2022-03-07 | Stop reason: HOSPADM

## 2022-03-07 RX ORDER — HEPARIN SODIUM 200 [USP'U]/100ML
INJECTION, SOLUTION INTRAVENOUS
Status: COMPLETED | OUTPATIENT
Start: 2022-03-07 | End: 2022-03-07

## 2022-03-07 RX ORDER — OXYCODONE AND ACETAMINOPHEN 5; 325 MG/1; MG/1
1 TABLET ORAL
Status: DISCONTINUED | OUTPATIENT
Start: 2022-03-07 | End: 2022-03-07 | Stop reason: HOSPADM

## 2022-03-07 RX ORDER — CEFAZOLIN SODIUM 1 G/3ML
INJECTION, POWDER, FOR SOLUTION INTRAMUSCULAR; INTRAVENOUS AS NEEDED
Status: DISCONTINUED | OUTPATIENT
Start: 2022-03-07 | End: 2022-03-07 | Stop reason: HOSPADM

## 2022-03-07 RX ORDER — SODIUM CHLORIDE 9 MG/ML
INJECTION, SOLUTION INTRAVENOUS
Status: DISCONTINUED | OUTPATIENT
Start: 2022-03-07 | End: 2022-03-07 | Stop reason: HOSPADM

## 2022-03-07 RX ORDER — MIDAZOLAM HYDROCHLORIDE 1 MG/ML
INJECTION, SOLUTION INTRAMUSCULAR; INTRAVENOUS AS NEEDED
Status: DISCONTINUED | OUTPATIENT
Start: 2022-03-07 | End: 2022-03-07 | Stop reason: HOSPADM

## 2022-03-07 RX ORDER — FENTANYL CITRATE 50 UG/ML
INJECTION, SOLUTION INTRAMUSCULAR; INTRAVENOUS AS NEEDED
Status: DISCONTINUED | OUTPATIENT
Start: 2022-03-07 | End: 2022-03-07 | Stop reason: HOSPADM

## 2022-03-07 RX ORDER — PROPOFOL 10 MG/ML
VIAL (ML) INTRAVENOUS
Status: DISCONTINUED | OUTPATIENT
Start: 2022-03-07 | End: 2022-03-07 | Stop reason: HOSPADM

## 2022-03-07 RX ORDER — SODIUM CHLORIDE 0.9 % (FLUSH) 0.9 %
5-40 SYRINGE (ML) INJECTION AS NEEDED
Status: DISCONTINUED | OUTPATIENT
Start: 2022-03-07 | End: 2022-03-07 | Stop reason: HOSPADM

## 2022-03-07 RX ADMIN — CEFAZOLIN SODIUM 2 G: 1 INJECTION, POWDER, FOR SOLUTION INTRAMUSCULAR; INTRAVENOUS at 11:45

## 2022-03-07 RX ADMIN — FENTANYL CITRATE 25 MCG: 50 INJECTION, SOLUTION INTRAMUSCULAR; INTRAVENOUS at 11:45

## 2022-03-07 RX ADMIN — SODIUM CHLORIDE: 9 INJECTION, SOLUTION INTRAVENOUS at 11:29

## 2022-03-07 RX ADMIN — PROPOFOL 50 MCG/KG/MIN: 10 INJECTION, EMULSION INTRAVENOUS at 11:38

## 2022-03-07 RX ADMIN — MIDAZOLAM HYDROCHLORIDE 1 MG: 2 INJECTION, SOLUTION INTRAMUSCULAR; INTRAVENOUS at 11:53

## 2022-03-07 RX ADMIN — FENTANYL CITRATE 50 MCG: 50 INJECTION, SOLUTION INTRAMUSCULAR; INTRAVENOUS at 11:53

## 2022-03-07 RX ADMIN — MIDAZOLAM HYDROCHLORIDE 1 MG: 2 INJECTION, SOLUTION INTRAMUSCULAR; INTRAVENOUS at 11:29

## 2022-03-07 RX ADMIN — FENTANYL CITRATE 25 MCG: 50 INJECTION, SOLUTION INTRAMUSCULAR; INTRAVENOUS at 11:29

## 2022-03-07 NOTE — PROGRESS NOTES
1030: Cath holding summary     Patient escorted to cath holding from waiting area ambulatory, alert and oriented x 4, voicing no complaints. Changed into gown and placed on monitor. NPO since MN. Lab results, med rec and H&P reviewed on chart. PIV x 1 inserted without difficulty. Family to bedside. 1100: Bedside and Verbal shift change report given to Devaughn Curiel RN (oncoming nurse) by Lise Frankel, RN (offgoing nurse).  Report included the following information SBAR, Kardex, Procedure Summary, Intake/Output, Recent Results, Cardiac Rhythm Sinus Lasha Adrienne and Pre Procedure Checklist.

## 2022-03-07 NOTE — Clinical Note
TRANSFER - IN REPORT:     [facility]. Report consisted of patient's Situation, Background, Assessment and   Recommendations(SBAR). Opportunity for questions and clarification was provided. Assessment completed upon patient's arrival to unit and care assumed. Patient transported with a Cardiac Cath Tech / Patient Care Tech.

## 2022-03-07 NOTE — DISCHARGE INSTRUCTIONS
Resume eliquis on Thursday morning, 3/10/22    Disposition:  Will need follow-up with device/wound check in 7 days in my office. Please contact office at [phone] to confirm appointment. Main Office:    [address] [place] [address]    Restrictions: For affected arm:  No lifting greater than 10 lbs or lifting elbow above shoulder for 4 weeks. Keep incision clean and dry for a total of 72 hours after procedure. Remove dressing in 7 days if not already removed. No hot tubs or pools for 2 weeks. OK to shower with \"pat\" dry incision after 72 hours. No driving ideally for 1 week due to concern for airbag, minimize for 1 additional week.

## 2022-03-07 NOTE — PROGRESS NOTES
1102-Bedside and Verbal  report given  by Dougie Jean Baptiste RN (offgoing nurse). Report included the following information SBAR, Kardex, MAR and Recent Results. 1225-TRANSFER - IN REPORT:    Verbal report received by Ang De Luna from Ame(name) on Rico Ball  being received from EP Lab(unit) for routine post - op      Report consisted of patients Situation, Background, Assessment and   Recommendations(SBAR). Information from the following report(s) SBAR, Kardex, Procedure Summary, Intake/Output, MAR and Cardiac Rhythm Paced was reviewed with the receiving nurse. Opportunity for questions and clarification was provided. Assessment completed upon patients arrival to unit and care assumed. 1500-AVS Discharge instructions reviewed with patient and copy given to patient. All questions answered. Patient verbalized understanding to all discharge instructions. PIV removed. Procedural site within normal limits. No hematoma or bleeding noted from procedural and PIV site. No pain noted at discharge. Patient discharged with support person in stable condition.   Escorted out to vehicle for transport home.

## 2022-03-07 NOTE — Clinical Note
TRANSFER - OUT REPORT:     [facility] Report consisted of patient's Situation, Background, Assessment and   Recommendations(SBAR). Opportunity for questions and clarification was provided.

## 2022-03-07 NOTE — ANESTHESIA PREPROCEDURE EVALUATION
Anesthetic History   No history of anesthetic complications            Review of Systems / Medical History  Patient summary reviewed, nursing notes reviewed and pertinent labs reviewed    Pulmonary  Within defined limits                 Neuro/Psych       CVA (vision problem)      Comments: 02/2022 Moderate (50-69%) stenosis in the b/l internal carotid artery  Cardiovascular    Hypertension        Dysrhythmias : atrial flutter  Hyperlipidemia      Comments: 11/2021 ECHO  Estimated LVEF is 55 - 60%   GI/Hepatic/Renal  Within defined limits              Endo/Other        Obesity     Other Findings            Physical Exam    Airway  Mallampati: II  TM Distance: 4 - 6 cm  Neck ROM: normal range of motion   Mouth opening: Normal     Cardiovascular    Rhythm: regular           Dental    Dentition: Lower dentition intact and Upper dentition intact     Pulmonary  Breath sounds clear to auscultation               Abdominal  GI exam deferred       Other Findings            Anesthetic Plan    ASA: 3  Anesthesia type: MAC            Anesthetic plan and risks discussed with: Patient

## 2022-03-07 NOTE — ANESTHESIA POSTPROCEDURE EVALUATION
Procedure(s):  INSERT PPM DUAL. MAC    Anesthesia Post Evaluation      Multimodal analgesia: multimodal analgesia used between 6 hours prior to anesthesia start to PACU discharge  Patient location during evaluation: PACU  Patient participation: complete - patient participated  Level of consciousness: awake and alert  Pain management: adequate  Airway patency: patent  Anesthetic complications: no  Cardiovascular status: acceptable and hemodynamically stable  Respiratory status: acceptable  Hydration status: acceptable  Post anesthesia nausea and vomiting:  controlled      INITIAL Post-op Vital signs:   Vitals Value Taken Time   /76 03/07/22 1301   Temp     Pulse 58 03/07/22 1308   Resp 15 03/07/22 1236   SpO2 99 % 03/07/22 1308   Vitals shown include unvalidated device data.

## 2022-03-17 ENCOUNTER — CLINICAL SUPPORT (OUTPATIENT)
Dept: CARDIOLOGY CLINIC | Age: 77
End: 2022-03-17
Payer: MEDICARE

## 2022-03-17 DIAGNOSIS — R00.1 SINUS BRADYCARDIA: Primary | ICD-10-CM

## 2022-03-17 DIAGNOSIS — Z95.0 PACEMAKER: ICD-10-CM

## 2022-03-17 PROCEDURE — 93280 PM DEVICE PROGR EVAL DUAL: CPT | Performed by: INTERNAL MEDICINE

## 2022-03-28 NOTE — PROGRESS NOTES
I have personally seen and evaluated the device findings. Interrogation reviewed and I agree with assessment.     Ifeanyi Alejandro

## (undated) DEVICE — SUTURE PERMA HND SZ 0 L18IN NONABSORBABLE BLK L30MM PSL REV 580H

## (undated) DEVICE — DRAPE SURG W25XL50IN E OPN CIR BND BG

## (undated) DEVICE — DRAPE STRL ANGIO W/ 2 FLD COLLCTN PCH 86X135 218X343 CM 2

## (undated) DEVICE — LIMB HOLDER, WRIST/ANKLE: Brand: DEROYAL

## (undated) DEVICE — SUTURE ABSORBABLE BRAIDED 2-0 CT-1 27 IN UD VICRYL J259H

## (undated) DEVICE — MEDI-TRACE CADENCE ADULT, DEFIBRILLATION ELECTRODE -RTS  (10 PR/PK) - PHYSIO-CONTROL: Brand: MEDI-TRACE CADENCE

## (undated) DEVICE — PENCIL ES CRD L10FT ROCK SWCH S STL HEX LOK BLDE ELECTRD

## (undated) DEVICE — REM POLYHESIVE ADULT PATIENT RETURN ELECTRODE: Brand: VALLEYLAB

## (undated) DEVICE — SUTURE MCRYL SZ 4 0 L18IN ABSRB VLT PS 1 L24MM 3 8 CIR REV Y682H

## (undated) DEVICE — KENDALL RADIOLUCENT FOAM MONITORING ELECTRODE RECTANGULAR SHAPE: Brand: KENDALL

## (undated) DEVICE — 3M™ IOBAN™ 2 ANTIMICROBIAL INCISE DRAPE 6640EZ: Brand: IOBAN™ 2

## (undated) DEVICE — INTRO SHTH 7FR 13X20CM -- TEARAWAY

## (undated) DEVICE — DRESSING FOAM 4X6 DISP POSTOP MEPILEX BORD AG

## (undated) DEVICE — CABLE PACE ALGTR CLP SAF 12FT --

## (undated) DEVICE — Device